# Patient Record
Sex: MALE | Race: BLACK OR AFRICAN AMERICAN | NOT HISPANIC OR LATINO | URBAN - METROPOLITAN AREA
[De-identification: names, ages, dates, MRNs, and addresses within clinical notes are randomized per-mention and may not be internally consistent; named-entity substitution may affect disease eponyms.]

---

## 2019-11-26 ENCOUNTER — EMERGENCY (EMERGENCY)
Facility: HOSPITAL | Age: 54
LOS: 1 days | Discharge: HOME | End: 2019-11-26
Admitting: EMERGENCY MEDICINE
Payer: SELF-PAY

## 2019-11-26 VITALS
WEIGHT: 179.9 LBS | OXYGEN SATURATION: 99 % | SYSTOLIC BLOOD PRESSURE: 114 MMHG | RESPIRATION RATE: 18 BRPM | DIASTOLIC BLOOD PRESSURE: 82 MMHG | HEART RATE: 88 BPM | TEMPERATURE: 98 F | HEIGHT: 70 IN

## 2019-11-26 DIAGNOSIS — R53.83 OTHER FATIGUE: ICD-10-CM

## 2019-11-26 DIAGNOSIS — R41.82 ALTERED MENTAL STATUS, UNSPECIFIED: ICD-10-CM

## 2019-11-26 DIAGNOSIS — F10.129 ALCOHOL ABUSE WITH INTOXICATION, UNSPECIFIED: ICD-10-CM

## 2019-11-26 PROCEDURE — 99284 EMERGENCY DEPT VISIT MOD MDM: CPT

## 2019-11-26 PROCEDURE — 99053 MED SERV 10PM-8AM 24 HR FAC: CPT

## 2019-11-26 NOTE — ED PROVIDER NOTE - CLINICAL SUMMARY MEDICAL DECISION MAKING FREE TEXT BOX
Alcohol intoxication with cocaine use, no signs of trauma, not hypoglycemic, neuro exam non-focal, will observe and reassess frequently.

## 2019-11-26 NOTE — ED PROVIDER NOTE - OBJECTIVE STATEMENT
53 y/o male BIB by EMS for AMS.  Admits to drinking alcohol and smoking cocaine today, no other complaints.  Placed in stretcher and quickly falls asleep.  Unable to cooperate with remainder of history.

## 2019-11-26 NOTE — ED ADULT TRIAGE NOTE - CHIEF COMPLAINT QUOTE
[place] and use of cocaine. Pt. with no complaints. No s/s of injury or fall

## 2019-11-26 NOTE — ED PROVIDER NOTE - UNABLE TO OBTAIN
HPI limited 2/2 patient's clinical condition. Urgent need for Intervention ROS limited 2/2 patient's clinical condition.

## 2019-11-26 NOTE — ED PROVIDER NOTE - PATIENT PORTAL LINK FT
You can access the FollowMyHealth Patient Portal offered by [facility] by registering at the following website: [email or web address]. By joining [employer]’s FollowMyHealth portal, you will also be able to view your health information using other applications (apps) compatible with our system.

## 2019-11-27 VITALS
TEMPERATURE: 98 F | DIASTOLIC BLOOD PRESSURE: 74 MMHG | OXYGEN SATURATION: 100 % | SYSTOLIC BLOOD PRESSURE: 118 MMHG | HEART RATE: 75 BPM | RESPIRATION RATE: 18 BRPM

## 2019-11-27 NOTE — ED ADULT NURSE NOTE - OBJECTIVE STATEMENT
53 y/o M [place] for alcohol intoxication and cocaine use. Pt is very lethargic and unable to provide PMH. RR 14, unlabored. Pupils sluggish. No obvious s/s of trauma.

## 2019-11-27 NOTE — ED ADULT NURSE NOTE - NSIMPLEMENTINTERV_GEN_ALL_ED
Implemented All Fall Risk Interventions:  [place] to call system. Call bell, personal items and telephone within reach. Instruct patient to call for assistance. Room bathroom lighting operational. Non-slip footwear when patient is off stretcher. Physically safe environment: no spills, clutter or unnecessary equipment. Stretcher in lowest position, wheels locked, appropriate side rails in place. Provide visual cue, wrist band, yellow gown, etc. Monitor gait and stability. Monitor for mental status changes and reorient to person, place, and time. Review medications for side effects contributing to fall risk. Reinforce activity limits and safety measures with patient and family.

## 2019-11-27 NOTE — ED ADULT NURSE NOTE - CHPI ED NUR SYMPTOMS NEG
no chills/no pain/no abdominal pain/no weakness/no nausea/no vomiting/no fever/no abdominal distension

## 2019-12-09 ENCOUNTER — EMERGENCY (EMERGENCY)
Facility: HOSPITAL | Age: 54
LOS: 1 days | Discharge: HOME | End: 2019-12-09
Attending: EMERGENCY MEDICINE
Payer: SELF-PAY

## 2019-12-09 VITALS
HEIGHT: 67 IN | WEIGHT: 220.02 LBS | SYSTOLIC BLOOD PRESSURE: 107 MMHG | OXYGEN SATURATION: 100 % | HEART RATE: 87 BPM | TEMPERATURE: 98 F | DIASTOLIC BLOOD PRESSURE: 67 MMHG | RESPIRATION RATE: 16 BRPM

## 2019-12-09 LAB
ANION GAP SERPL CALC-SCNC: 6 MMOL/L — SIGNIFICANT CHANGE UP (ref 5–17)
BUN SERPL-MCNC: 10 MG/DL — SIGNIFICANT CHANGE UP (ref 7–18)
CALCIUM SERPL-MCNC: 8.6 MG/DL — SIGNIFICANT CHANGE UP (ref 8.4–10.5)
CHLORIDE SERPL-SCNC: 107 MMOL/L — SIGNIFICANT CHANGE UP (ref 96–108)
CO2 SERPL-SCNC: 26 MMOL/L — SIGNIFICANT CHANGE UP (ref 22–31)
CREAT SERPL-MCNC: 0.87 MG/DL — SIGNIFICANT CHANGE UP (ref 0.5–1.3)
ETHANOL SERPL-MCNC: 213 MG/DL — HIGH (ref 0–10)
GLUCOSE SERPL-MCNC: 199 MG/DL — HIGH (ref 70–99)
HCT VFR BLD CALC: 39.8 % — SIGNIFICANT CHANGE UP (ref 39–50)
HGB BLD-MCNC: 13.2 G/DL — SIGNIFICANT CHANGE UP (ref 13–17)
MCHC RBC-ENTMCNC: 28.3 PG — SIGNIFICANT CHANGE UP (ref 27–34)
MCHC RBC-ENTMCNC: 33.2 GM/DL — SIGNIFICANT CHANGE UP (ref 32–36)
MCV RBC AUTO: 85.2 FL — SIGNIFICANT CHANGE UP (ref 80–100)
NRBC # BLD: 0 /100 WBCS — SIGNIFICANT CHANGE UP (ref 0–0)
PLATELET # BLD AUTO: 322 K/UL — SIGNIFICANT CHANGE UP (ref 150–400)
POTASSIUM SERPL-MCNC: 3.5 MMOL/L — SIGNIFICANT CHANGE UP (ref 3.5–5.3)
POTASSIUM SERPL-SCNC: 3.5 MMOL/L — SIGNIFICANT CHANGE UP (ref 3.5–5.3)
RBC # BLD: 4.67 M/UL — SIGNIFICANT CHANGE UP (ref 4.2–5.8)
RBC # FLD: 15.5 % — HIGH (ref 10.3–14.5)
SODIUM SERPL-SCNC: 139 MMOL/L — SIGNIFICANT CHANGE UP (ref 135–145)
WBC # BLD: 9.55 K/UL — SIGNIFICANT CHANGE UP (ref 3.8–10.5)
WBC # FLD AUTO: 9.55 K/UL — SIGNIFICANT CHANGE UP (ref 3.8–10.5)

## 2019-12-09 PROCEDURE — 99283 EMERGENCY DEPT VISIT LOW MDM: CPT

## 2019-12-09 PROCEDURE — 36415 COLL VENOUS BLD VENIPUNCTURE: CPT

## 2019-12-09 PROCEDURE — 80048 BASIC METABOLIC PNL TOTAL CA: CPT

## 2019-12-09 PROCEDURE — 82962 GLUCOSE BLOOD TEST: CPT

## 2019-12-09 PROCEDURE — 80307 DRUG TEST PRSMV CHEM ANLYZR: CPT

## 2019-12-09 PROCEDURE — 99285 EMERGENCY DEPT VISIT HI MDM: CPT | Mod: 25

## 2019-12-09 PROCEDURE — 85027 COMPLETE CBC AUTOMATED: CPT

## 2019-12-09 NOTE — ED PROVIDER NOTE - PHYSICAL EXAMINATION
General: pt lying in stretcher, appears stated age and is not in distress  HEENT: AT/NC, pink conjunctiva, anicteric sclerae, EOMI, PERRLA, TMs smooth, grey, intact, with normal landmarks, nasal mucosa pink, no discharge, turbinates not enlarged; moist mucus membranes, tongue well-papillated, good dentition; posterior pharynx shows no erythema or exudates;   Neck: supple, full ROM, trachea midline, no JVD, no cervical LAD, no midline ttp or stepoffs  Lungs: symmetric excursion, b/l clear vesicular breath sounds with no wheezes, crackles, or rhonchi  Heart: rrr, S1, S2 normal; no S3 or S4; no murmurs or rubs  Abd: normal bowel sounds; soft, nontender; negative McBurney's point tenderness, negative Perry's sign, no rebound, no guarding, spleen non-palpable; no hepatomegaly, no masses  Back: no midline spinal tenderness or stepoffs, no costovertebral angle tenderness  Extremities: no clubbing, cyanosis, or edema; no palpable deformities or fractures  Skin: good turgor; no rashes, petechiae, ecchymoses, or jaundice  Pulses: radial, posterior tibialis (PT), dorsalis pedis (DP) all 2+ & symmetric  Neuro: awake, alert, responsive; oriented to person, place and time; cranial nerves intact, EOMI, intact jaw movement, intact facial sensation, no facial asymmetry, hearing intact; no nystagmus, tongue midline; Motor: Normal tone in upper and lower extremities bilaterally strength 5/5; Sensory: intact to pinprick and light touch; Cerebellar: finger-to-nose intact; normal steady gait; negative Romberg’s sign; DTR: biceps, triceps, patellar, 2+, no pronator drift General: pt lying in stretcher, appears stated age and is not in distress, speaking in full clear sentences  HEENT: AT/NC, pink conjunctiva, anicteric sclerae, EOMI, PERRLA, moist mucus membranes, tongue well-papillated, posterior pharynx shows no erythema or exudates;   Neck: supple, full ROM, trachea midline, no JVD, no cervical LAD, no midline ttp or stepoffs  Lungs: symmetric excursion, b/l clear vesicular breath sounds with no wheezes, crackles, or rhonchi  Heart: rrr, S1, S2 normal; no S3 or S4; no murmurs or rubs  Abd: normal bowel sounds; soft, nontender; negative McBurney's point tenderness, negative Perry's sign, no rebound, no guarding, spleen non-palpable; no hepatomegaly, no masses  Back: no midline spinal tenderness or stepoffs, no costovertebral angle tenderness  Extremities: no clubbing, cyanosis, or edema; no palpable deformities or fractures  Skin: good turgor; no rashes, petechiae, ecchymoses, or jaundice  Pulses: radial, posterior tibialis (PT), dorsalis pedis (DP) all 2+ & symmetric  Neuro: awake, alert, responsive; oriented to person, place and time; cranial nerves intact, EOMI, intact jaw movement, intact facial sensation, no facial asymmetry, hearing intact; no nystagmus, tongue midline; Motor: Normal tone in upper and lower extremities bilaterally strength 5/5; Sensory: intact; normal steady gait;

## 2019-12-09 NOTE — ED PROVIDER NOTE - CLINICAL SUMMARY MEDICAL DECISION MAKING FREE TEXT BOX
Pt brought in for alcohol intoxication w/ no signs of trauma. Will get labs, monitor and reassess  Pt monitored uneventfully. aaox3, ambulating w/ normal steady gait, clinically sober, ready for dc home. Pt brought in for alcohol intoxication w/ no signs of trauma. Will get labs, monitor and reassess  Pt monitored uneventfully. He refused labs. aaox3, ambulating w/ normal steady gait, clinically sober, ready for dc home.

## 2019-12-09 NOTE — ED PROVIDER NOTE - OBJECTIVE STATEMENT
54 year old male with no pertinent PSHx or PMHx presents to the ED via EMS [place]. Patient admits to alcohol use. Patient otherwise denies any trauma or falls. EDWINADA.

## 2019-12-09 NOTE — ED PROVIDER NOTE - PATIENT PORTAL LINK FT
You can access the FollowMyHealth Patient Portal offered by [facility] by registering at the following website: [email or web address]. By joining [employer]’s FollowMyHealth portal, you will also be able to view your health information using other applications (apps) compatible with our system.

## 2019-12-09 NOTE — ED ADULT NURSE NOTE - INTEGUMENTARY WDL
Patient presented to the ED with complaints of nausea and vomiting  She was nauseous all day yesterday yesterday and felt under the weather  Around 10PM patient started to vomit with coffee ground emesis  Last BM was around midnight and it was normal   Patient was just discharged from the hospital on 7/10 for an elective laparoscopy  Hgb at baseline, 9 8      · Admit to medicine  · Hgb q 8 hours, transfuse for acute drop  · NPO  · Protonix drip  · IVF, zofran PRN  · GI consultation Color consistent with ethnicity/race, warm, dry intact, resilient.

## 2020-03-25 ENCOUNTER — EMERGENCY (EMERGENCY)
Facility: HOSPITAL | Age: 55
LOS: 1 days | Discharge: HOME | End: 2020-03-25
Attending: EMERGENCY MEDICINE
Payer: MEDICAID

## 2020-03-25 VITALS
SYSTOLIC BLOOD PRESSURE: 132 MMHG | DIASTOLIC BLOOD PRESSURE: 86 MMHG | HEART RATE: 80 BPM | OXYGEN SATURATION: 100 % | TEMPERATURE: 98 F | RESPIRATION RATE: 18 BRPM

## 2020-03-25 VITALS
HEIGHT: 68 IN | RESPIRATION RATE: 20 BRPM | TEMPERATURE: 98 F | WEIGHT: 158.07 LBS | HEART RATE: 81 BPM | SYSTOLIC BLOOD PRESSURE: 141 MMHG | DIASTOLIC BLOOD PRESSURE: 86 MMHG | OXYGEN SATURATION: 100 %

## 2020-03-25 VITALS
DIASTOLIC BLOOD PRESSURE: 68 MMHG | HEIGHT: 68 IN | WEIGHT: 167.99 LBS | RESPIRATION RATE: 20 BRPM | OXYGEN SATURATION: 99 % | SYSTOLIC BLOOD PRESSURE: 96 MMHG | HEART RATE: 83 BPM

## 2020-03-25 LAB
ALBUMIN SERPL ELPH-MCNC: 1.8 G/DL — LOW (ref 3.3–5)
ALBUMIN SERPL ELPH-MCNC: 4 G/DL — SIGNIFICANT CHANGE UP (ref 3.3–5)
ALP SERPL-CCNC: 39 U/L — LOW (ref 40–120)
ALP SERPL-CCNC: 74 U/L — SIGNIFICANT CHANGE UP (ref 40–120)
ALT FLD-CCNC: 16 U/L — SIGNIFICANT CHANGE UP (ref 10–45)
ALT FLD-CCNC: <5 U/L — LOW (ref 10–45)
ANION GAP SERPL CALC-SCNC: 12 MMOL/L — SIGNIFICANT CHANGE UP (ref 5–17)
ANION GAP SERPL CALC-SCNC: 14 MMOL/L — SIGNIFICANT CHANGE UP (ref 5–17)
AST SERPL-CCNC: 12 U/L — SIGNIFICANT CHANGE UP (ref 10–40)
AST SERPL-CCNC: 70 U/L — HIGH (ref 10–40)
BASOPHILS # BLD AUTO: 0.05 K/UL — SIGNIFICANT CHANGE UP (ref 0–0.2)
BASOPHILS NFR BLD AUTO: 0.7 % — SIGNIFICANT CHANGE UP (ref 0–2)
BILIRUB SERPL-MCNC: 0.2 MG/DL — SIGNIFICANT CHANGE UP (ref 0.2–1.2)
BILIRUB SERPL-MCNC: 0.4 MG/DL — SIGNIFICANT CHANGE UP (ref 0.2–1.2)
BUN SERPL-MCNC: 5 MG/DL — LOW (ref 7–23)
BUN SERPL-MCNC: 8 MG/DL — SIGNIFICANT CHANGE UP (ref 7–23)
CALCIUM SERPL-MCNC: 5.2 MG/DL — CRITICAL LOW (ref 8.4–10.5)
CALCIUM SERPL-MCNC: 9.7 MG/DL — SIGNIFICANT CHANGE UP (ref 8.4–10.5)
CHLORIDE SERPL-SCNC: 115 MMOL/L — HIGH (ref 96–108)
CHLORIDE SERPL-SCNC: 90 MMOL/L — LOW (ref 96–108)
CO2 SERPL-SCNC: 13 MMOL/L — LOW (ref 22–31)
CO2 SERPL-SCNC: 23 MMOL/L — SIGNIFICANT CHANGE UP (ref 22–31)
CREAT SERPL-MCNC: 0.36 MG/DL — LOW (ref 0.5–1.3)
CREAT SERPL-MCNC: 0.56 MG/DL — SIGNIFICANT CHANGE UP (ref 0.5–1.3)
EOSINOPHIL # BLD AUTO: 0.16 K/UL — SIGNIFICANT CHANGE UP (ref 0–0.5)
EOSINOPHIL NFR BLD AUTO: 2.1 % — SIGNIFICANT CHANGE UP (ref 0–6)
GLUCOSE SERPL-MCNC: 255 MG/DL — HIGH (ref 70–99)
GLUCOSE SERPL-MCNC: 397 MG/DL — HIGH (ref 70–99)
HCT VFR BLD CALC: 44.7 % — SIGNIFICANT CHANGE UP (ref 39–50)
HGB BLD-MCNC: 14.1 G/DL — SIGNIFICANT CHANGE UP (ref 13–17)
IMM GRANULOCYTES NFR BLD AUTO: 0.3 % — SIGNIFICANT CHANGE UP (ref 0–1.5)
LYMPHOCYTES # BLD AUTO: 2.46 K/UL — SIGNIFICANT CHANGE UP (ref 1–3.3)
LYMPHOCYTES # BLD AUTO: 32.8 % — SIGNIFICANT CHANGE UP (ref 13–44)
MCHC RBC-ENTMCNC: 27.5 PG — SIGNIFICANT CHANGE UP (ref 27–34)
MCHC RBC-ENTMCNC: 31.5 GM/DL — LOW (ref 32–36)
MCV RBC AUTO: 87.1 FL — SIGNIFICANT CHANGE UP (ref 80–100)
MONOCYTES # BLD AUTO: 0.49 K/UL — SIGNIFICANT CHANGE UP (ref 0–0.9)
MONOCYTES NFR BLD AUTO: 6.5 % — SIGNIFICANT CHANGE UP (ref 2–14)
NEUTROPHILS # BLD AUTO: 4.32 K/UL — SIGNIFICANT CHANGE UP (ref 1.8–7.4)
NEUTROPHILS NFR BLD AUTO: 57.6 % — SIGNIFICANT CHANGE UP (ref 43–77)
NRBC # BLD: 0 /100 WBCS — SIGNIFICANT CHANGE UP (ref 0–0)
PLATELET # BLD AUTO: 399 K/UL — SIGNIFICANT CHANGE UP (ref 150–400)
POTASSIUM SERPL-MCNC: 2.9 MMOL/L — CRITICAL LOW (ref 3.5–5.3)
POTASSIUM SERPL-MCNC: 7.3 MMOL/L — CRITICAL HIGH (ref 3.5–5.3)
POTASSIUM SERPL-SCNC: 2.9 MMOL/L — CRITICAL LOW (ref 3.5–5.3)
POTASSIUM SERPL-SCNC: 7.3 MMOL/L — CRITICAL HIGH (ref 3.5–5.3)
PROT SERPL-MCNC: 4.6 G/DL — LOW (ref 6–8.3)
PROT SERPL-MCNC: 9.6 G/DL — HIGH (ref 6–8.3)
RBC # BLD: 5.13 M/UL — SIGNIFICANT CHANGE UP (ref 4.2–5.8)
RBC # FLD: 15.8 % — HIGH (ref 10.3–14.5)
SODIUM SERPL-SCNC: 127 MMOL/L — LOW (ref 135–145)
SODIUM SERPL-SCNC: 140 MMOL/L — SIGNIFICANT CHANGE UP (ref 135–145)
WBC # BLD: 7.5 K/UL — SIGNIFICANT CHANGE UP (ref 3.8–10.5)
WBC # FLD AUTO: 7.5 K/UL — SIGNIFICANT CHANGE UP (ref 3.8–10.5)

## 2020-03-25 PROCEDURE — 70450 CT HEAD/BRAIN W/O DYE: CPT | Mod: 26

## 2020-03-25 PROCEDURE — 99285 EMERGENCY DEPT VISIT HI MDM: CPT

## 2020-03-25 PROCEDURE — 71046 X-RAY EXAM CHEST 2 VIEWS: CPT | Mod: 26

## 2020-03-25 PROCEDURE — 80053 COMPREHEN METABOLIC PANEL: CPT

## 2020-03-25 PROCEDURE — 70450 CT HEAD/BRAIN W/O DYE: CPT

## 2020-03-25 PROCEDURE — 72125 CT NECK SPINE W/O DYE: CPT | Mod: 26

## 2020-03-25 PROCEDURE — 93010 ELECTROCARDIOGRAM REPORT: CPT

## 2020-03-25 PROCEDURE — 85027 COMPLETE CBC AUTOMATED: CPT

## 2020-03-25 PROCEDURE — 72125 CT NECK SPINE W/O DYE: CPT

## 2020-03-25 PROCEDURE — 71046 X-RAY EXAM CHEST 2 VIEWS: CPT

## 2020-03-25 RX ORDER — SODIUM CHLORIDE 9 MG/ML
1000 INJECTION, SOLUTION INTRAVENOUS ONCE
Refills: 0 | Status: COMPLETED | OUTPATIENT
Start: 2020-03-25 | End: 2020-03-25

## 2020-03-25 RX ADMIN — SODIUM CHLORIDE 1000 MILLILITER(S): 9 INJECTION, SOLUTION INTRAVENOUS at 18:01

## 2020-03-25 NOTE — ED PROVIDER NOTE - ATTENDING CONTRIBUTION TO CARE
Pt drinking alcohol, and states he "fell out."  Hit head, no LOC.  Pain posterior head.  Drinks daily, last drink apparently in waiting room.  Pt awake, alert, oriented, no c/t/l spine td, clear lungs, ab soft, nt, no external signs of trauma of entire body.  Will ct head, screening labs to r/o hyponatremia/metabolic derangement, and iv fluids and likely discharge when clinically sober.

## 2020-03-25 NOTE — ED PROVIDER NOTE - ENMT, MLM
Airway patent, Nasal mucosa clear. Mouth with normal mucosa. Throat has no vesicles, no oropharyngeal exudates and uvula is midline. No tongue tremors and fasciculation.

## 2020-03-25 NOTE — ED PROVIDER NOTE - OBJECTIVE STATEMENT
55M PMHx of ETOH (last drink 1.5 hrs ago, 1.5 pints liquor per day) p/w alcohol intoxication and fall. Reports mechanical fall and 1 vomiting episode. Of note, urinated in waiting room. Denies any cp, sob, cough, fevers, abdominal pain, headaches. Home: [place], [place], here in [facility] visiting a friend.

## 2020-03-25 NOTE — ED PROVIDER NOTE - PATIENT PORTAL LINK FT
You can access the FollowMyHealth Patient Portal offered by [facility] by registering at the following website: [email or web address]. By joining "[employer]"’s FollowMyHealth portal, you will also be able to view your health information using other applications (apps) compatible with our system.

## 2020-03-25 NOTE — ED PROVIDER NOTE - NSFOLLOWUPINSTRUCTIONS_ED_ALL_ED_FT
return to the emergency room if you experience worsening symptoms or new concerning symptoms.    Alcohol Abuse    Alcohol intoxication occurs when the amount of alcohol that a person has consumed impairs his or her ability to mentally and physically function. Chronic alcohol consumption can also lead to a variety of health issues including neurological disease, stomach disease, heart disease, liver disease, etc. Do not drive after drinking alcohol. Drinking enough alcohol to end up in an Emergency Room suggests you may have an alcohol abuse problem. Seek help at a drug addiction center.    SEEK IMMEDIATE MEDICAL CARE IF YOU HAVE ANY OF THE FOLLOWING SYMPTOMS: seizures, vomiting blood, blood in your stool, lightheadedness/dizziness, or becoming shaky to tremulous when you stop drinking.

## 2020-03-25 NOTE — ED ADULT TRIAGE NOTE - CHIEF COMPLAINT QUOTE
Pt who has +alcohol on breath; was [place] ambulance [place] mall; Pt c/o SOB and cough for 2 days

## 2020-03-25 NOTE — ED PROVIDER NOTE - CONSTITUTIONAL, MLM
normal... Intoxicated appearing, awake, alert, oriented to person, place, time/situation and in no apparent distress.

## 2020-03-25 NOTE — ED ADULT NURSE NOTE - OBJECTIVE STATEMENT
55 y M arrived to the ED  ETOH. Pt arrived to the ED ETOH, poor historian states he has been drinking for 22 years and drinks 1 1/2 pints of liquor per day. Pt irritable upon arrival asking for food, states he has been having sob for two days. Pt able to ambulate. MD CJ at bedside

## 2020-03-25 NOTE — ED ADULT NURSE NOTE - NSIMPLEMENTINTERV_GEN_ALL_ED
Implemented All Fall Risk Interventions:  [place] to call system. Call bell, personal items and telephone within reach. Instruct patient to call for assistance. Room bathroom lighting operational. Non-slip footwear when patient is off stretcher. Physically safe environment: no spills, clutter or unnecessary equipment. Stretcher in lowest position, wheels locked, appropriate side rails in place. Provide visual cue, wrist band, yellow gown, etc. Monitor gait and stability. Monitor for mental status changes and reorient to person, place, and time. Review medications for side effects contributing to fall risk. Reinforce activity limits and safety measures with patient and family.

## 2020-03-25 NOTE — ED PROVIDER NOTE - CLINICAL SUMMARY MEDICAL DECISION MAKING FREE TEXT BOX
ct head/neck for traumatic fall, unwitnessed. clearly intoxicated, last drink 1 hour ago, not in any withdrawal symptoms. not tremulous or tachycardic. will observe till he is clinically sober.

## 2020-03-26 VITALS — HEART RATE: 94 BPM

## 2020-03-26 LAB
ALBUMIN SERPL ELPH-MCNC: 3.7 G/DL — SIGNIFICANT CHANGE UP (ref 3.3–5)
ALP SERPL-CCNC: 73 U/L — SIGNIFICANT CHANGE UP (ref 40–120)
ALT FLD-CCNC: 8 U/L — LOW (ref 10–45)
ANION GAP SERPL CALC-SCNC: 15 MMOL/L — SIGNIFICANT CHANGE UP (ref 5–17)
AST SERPL-CCNC: 14 U/L — SIGNIFICANT CHANGE UP (ref 10–40)
BASOPHILS # BLD AUTO: 0.05 K/UL — SIGNIFICANT CHANGE UP (ref 0–0.2)
BASOPHILS NFR BLD AUTO: 0.8 % — SIGNIFICANT CHANGE UP (ref 0–2)
BILIRUB SERPL-MCNC: 0.6 MG/DL — SIGNIFICANT CHANGE UP (ref 0.2–1.2)
BUN SERPL-MCNC: 10 MG/DL — SIGNIFICANT CHANGE UP (ref 7–23)
CALCIUM SERPL-MCNC: 9.5 MG/DL — SIGNIFICANT CHANGE UP (ref 8.4–10.5)
CHLORIDE SERPL-SCNC: 92 MMOL/L — LOW (ref 96–108)
CO2 SERPL-SCNC: 24 MMOL/L — SIGNIFICANT CHANGE UP (ref 22–31)
CREAT SERPL-MCNC: 0.84 MG/DL — SIGNIFICANT CHANGE UP (ref 0.5–1.3)
EOSINOPHIL # BLD AUTO: 0.26 K/UL — SIGNIFICANT CHANGE UP (ref 0–0.5)
EOSINOPHIL NFR BLD AUTO: 4.3 % — SIGNIFICANT CHANGE UP (ref 0–6)
GLUCOSE SERPL-MCNC: 281 MG/DL — HIGH (ref 70–99)
HCT VFR BLD CALC: 38.8 % — LOW (ref 39–50)
HGB BLD-MCNC: 12.8 G/DL — LOW (ref 13–17)
IMM GRANULOCYTES NFR BLD AUTO: 0.3 % — SIGNIFICANT CHANGE UP (ref 0–1.5)
LYMPHOCYTES # BLD AUTO: 1.88 K/UL — SIGNIFICANT CHANGE UP (ref 1–3.3)
LYMPHOCYTES # BLD AUTO: 30.8 % — SIGNIFICANT CHANGE UP (ref 13–44)
MCHC RBC-ENTMCNC: 28.5 PG — SIGNIFICANT CHANGE UP (ref 27–34)
MCHC RBC-ENTMCNC: 33 GM/DL — SIGNIFICANT CHANGE UP (ref 32–36)
MCV RBC AUTO: 86.4 FL — SIGNIFICANT CHANGE UP (ref 80–100)
MONOCYTES # BLD AUTO: 0.64 K/UL — SIGNIFICANT CHANGE UP (ref 0–0.9)
MONOCYTES NFR BLD AUTO: 10.5 % — SIGNIFICANT CHANGE UP (ref 2–14)
NEUTROPHILS # BLD AUTO: 3.25 K/UL — SIGNIFICANT CHANGE UP (ref 1.8–7.4)
NEUTROPHILS NFR BLD AUTO: 53.3 % — SIGNIFICANT CHANGE UP (ref 43–77)
NRBC # BLD: 0 /100 WBCS — SIGNIFICANT CHANGE UP (ref 0–0)
PLATELET # BLD AUTO: 380 K/UL — SIGNIFICANT CHANGE UP (ref 150–400)
POTASSIUM SERPL-MCNC: 3.7 MMOL/L — SIGNIFICANT CHANGE UP (ref 3.5–5.3)
POTASSIUM SERPL-SCNC: 3.7 MMOL/L — SIGNIFICANT CHANGE UP (ref 3.5–5.3)
PROT SERPL-MCNC: 8.1 G/DL — SIGNIFICANT CHANGE UP (ref 6–8.3)
RBC # BLD: 4.49 M/UL — SIGNIFICANT CHANGE UP (ref 4.2–5.8)
RBC # FLD: 15.2 % — HIGH (ref 10.3–14.5)
SODIUM SERPL-SCNC: 131 MMOL/L — LOW (ref 135–145)
TROPONIN T, HIGH SENSITIVITY RESULT: 22 NG/L — SIGNIFICANT CHANGE UP (ref 0–51)
TROPONIN T, HIGH SENSITIVITY RESULT: 24 NG/L — SIGNIFICANT CHANGE UP (ref 0–51)
WBC # BLD: 6.1 K/UL — SIGNIFICANT CHANGE UP (ref 3.8–10.5)
WBC # FLD AUTO: 6.1 K/UL — SIGNIFICANT CHANGE UP (ref 3.8–10.5)

## 2020-03-26 PROCEDURE — 71046 X-RAY EXAM CHEST 2 VIEWS: CPT | Mod: 26

## 2020-03-26 PROCEDURE — 99284 EMERGENCY DEPT VISIT MOD MDM: CPT

## 2020-03-26 PROCEDURE — 85027 COMPLETE CBC AUTOMATED: CPT

## 2020-03-26 PROCEDURE — 71046 X-RAY EXAM CHEST 2 VIEWS: CPT

## 2020-03-26 PROCEDURE — 84484 ASSAY OF TROPONIN QUANT: CPT

## 2020-03-26 PROCEDURE — 93005 ELECTROCARDIOGRAM TRACING: CPT

## 2020-03-26 PROCEDURE — 80053 COMPREHEN METABOLIC PANEL: CPT

## 2020-03-26 RX ORDER — SODIUM CHLORIDE 9 MG/ML
1000 INJECTION INTRAMUSCULAR; INTRAVENOUS; SUBCUTANEOUS ONCE
Refills: 0 | Status: DISCONTINUED | OUTPATIENT
Start: 2020-03-26 | End: 2020-03-29

## 2020-03-26 RX ORDER — SODIUM CHLORIDE 9 MG/ML
1000 INJECTION INTRAMUSCULAR; INTRAVENOUS; SUBCUTANEOUS ONCE
Refills: 0 | Status: COMPLETED | OUTPATIENT
Start: 2020-03-26 | End: 2020-03-26

## 2020-03-26 RX ADMIN — SODIUM CHLORIDE 1000 MILLILITER(S): 9 INJECTION INTRAMUSCULAR; INTRAVENOUS; SUBCUTANEOUS at 08:19

## 2020-03-26 RX ADMIN — Medication 25 MILLIGRAM(S): at 08:19

## 2020-03-26 NOTE — ED PROVIDER NOTE - PATIENT PORTAL LINK FT
You can access the FollowMyHealth Patient Portal offered by [facility] by registering at the following website: [email or web address]. By joining [employer]’s FollowMyHealth portal, you will also be able to view your health information using other applications (apps) compatible with our system.

## 2020-03-26 NOTE — ED PROVIDER NOTE - CLINICAL SUMMARY MEDICAL DECISION MAKING FREE TEXT BOX
Francisco Javier, PGY1 - 55M PMH HTN, DM, seizures, ETOH abuse p/w intermittent CP and SOB x 14hrs PTA. Well-appearing on exam, vitals WNL. CTAB. EKG NSR with RBBB. Will evaluate for ACS. Plan: labs, cxr.

## 2020-03-26 NOTE — ED PROVIDER NOTE - NS ED ROS FT
General: Denies fevers or chills  Eyes: Denies vision changes  ENMT: Denies trouble swallowing or speaking, or sore throat  CV: +Chest pain  Resp: +SOB. Denies cough  GI: Denies abdominal pain, nausea, vomiting, diarrhea, or constipation   : Denies painful urination, increased urinary frequency, or blood in urine  Skin: Denies new rashes  Neuro: Denies headache, numbness, or weakness  MSK: No joint pain

## 2020-03-26 NOTE — ED ADULT NURSE NOTE - CHPI ED NUR SYMPTOMS NEG
no syncope/no chills/no diaphoresis/no congestion/no dizziness/no nausea/no back pain/no fever/no vomiting/no shortness of breath

## 2020-03-26 NOTE — ED PROVIDER NOTE - PROGRESS NOTE DETAILS
Rebecca: Patient received in sign out. Noted to be mildly tachycardic. This resolved after fluid bolus and librium. patient feels symptomatically improved, walking around and tolerating PO. Patient chest pain appears MSK in nature as he had recent fall onto his bottom a few days ago. His chest pain is worse when he takes deep breaths in and moves his arms.

## 2020-03-26 NOTE — ED ADULT NURSE NOTE - OBJECTIVE STATEMENT
54 yo M pmh of seizures, DM, HTN, ETOH abuse, denies withdrawals, last drink yesterday ambulated to ED c/o sudden onset CP at 22:00 last night that resolved on its own after a hour.  Pt states that he has had this pain before, but never saw a cardiologist.  Denies CP, back pain, SOB, fevers/chills, n/v/d, lightheadedness, dizziness, changes in urinary or bowel habits.  A&Ox4, VSS, IV established in right forearm.  EKG performed. Safety and comfort maintained. Will continue to monitor.

## 2020-03-26 NOTE — ED PROVIDER NOTE - ATTENDING CONTRIBUTION TO CARE
pt in 1 day of intermittent cp, cough. no sob, fever. on exam, very well appearing, clear lungs, breathing comfortably, no fever, normal vitals. low suspicion for ACS, low suspcion for significant infection. will check labs, cxr, trop but if neg, suspect ok to d/c.

## 2020-03-26 NOTE — ED PROVIDER NOTE - OBJECTIVE STATEMENT
55M PMH HTN, DM, seizures, ETOH abuse (daily drinker, last drink yesterday, denies h/o alcohol withdrawal/seizures) p/w intermittent CP and SOB x14hrs PTA. States he's had this CP before, but has never seen a cardiologist. No associated n/v/sweats. Currently asymptomatic. No f/c, URI sx, abdominal pain, dysuria. Of note, pt was evaluated in ER yesterday for alcohol intoxication and fall.

## 2020-03-27 ENCOUNTER — EMERGENCY (EMERGENCY)
Facility: HOSPITAL | Age: 55
LOS: 1 days | Discharge: HOME | End: 2020-03-27
Attending: EMERGENCY MEDICINE | Admitting: EMERGENCY MEDICINE
Payer: COMMERCIAL

## 2020-03-27 VITALS
TEMPERATURE: 99 F | HEIGHT: 68 IN | HEART RATE: 77 BPM | SYSTOLIC BLOOD PRESSURE: 128 MMHG | OXYGEN SATURATION: 97 % | DIASTOLIC BLOOD PRESSURE: 76 MMHG | WEIGHT: 149.91 LBS | RESPIRATION RATE: 17 BRPM

## 2020-03-27 VITALS
DIASTOLIC BLOOD PRESSURE: 64 MMHG | HEIGHT: 68 IN | WEIGHT: 149.91 LBS | OXYGEN SATURATION: 98 % | HEART RATE: 99 BPM | RESPIRATION RATE: 17 BRPM | TEMPERATURE: 98 F | SYSTOLIC BLOOD PRESSURE: 96 MMHG

## 2020-03-27 DIAGNOSIS — Z04.89 ENCOUNTER FOR EXAMINATION AND OBSERVATION FOR OTHER SPECIFIED REASONS: ICD-10-CM

## 2020-03-27 DIAGNOSIS — Z76.5 MALINGERER [CONSCIOUS SIMULATION]: ICD-10-CM

## 2020-03-27 PROCEDURE — 99283 EMERGENCY DEPT VISIT LOW MDM: CPT

## 2020-03-27 PROCEDURE — 99053 MED SERV 10PM-8AM 24 HR FAC: CPT

## 2020-03-27 NOTE — ED PROVIDER NOTE - CLINICAL SUMMARY MEDICAL DECISION MAKING FREE TEXT BOX
Pt undomiciled, brought in by EMS for sleeping on the street. No specific complains on arrival. Walking without difficulty. Escorted out by security.

## 2020-03-27 NOTE — ED PROVIDER NOTE - PRO INTERPRETER NEED 2
1.  Continue bowel rest with NG tube suction and NPO status  2.  Continue IV fluids  3.  Continue pain control as needed  4.  Surgical consult to determine further treatment plan  5.  Empiric antibiotics of Zosyn and Flagyl  6.  Follow as needed based on clinical course    September 26th 2018:  This patient will be taken for exploratory laparotomy later today                                        Will follow up postsurgically as needed                                         Will repeat labs in the a.m.    September 27, 2018:  Replace magnesium IV 2 g IV over 2 hr recheck in the morning addendum                                       Continue pain control and NPO status until bowel function returned                                       Continue current pain and nausea medications and follow this patient as needed  09/28/2018.  The patient has bowel sounds this morning he has not passed flatus or had a bowel movement.  He does state that he is tender but otherwise doing well.  Patient has excellent breath sounds he can do his IS at 17 50 cc   09/29/2018.  The patient is doing very well today he is passing flatus.  No bowel movements yet.  Patient complaints of mild abdominal pain this morning.  Examination is unrevealing.  Patient is awaiting increasing diet as possible.  The patient has been ambulating and use incentive spirometry.  Discharge is pending per Dr. Horner.  Continue current level of care.    09/30/2028.  See above dictation for assessment and plan. Soft diet today ambulation as possible today consult physical therapy today.  Patient needs to be more ambulatory.  Patient exam LA than blood pressure is fine.  Have added blood pressure medications.  Encouraged the patient to get out of the bed.  Basic exam is within normal limits except for tenderness of the abdomen done status post surgery.  Satisfactory recovery at this time.    10/01/2018:  Will continue ambulating patient                         Referred back to clear liquid diet                         Advanced diet to regular as tolerated                         Monitor and control symptoms overnight                         Possible discharge tomorrow if stable    10/02/2018:  Discharge to home in stable condition.                        He will be discharged if tolerates full liquid diet for supper.                        Follow-up with Dr. Horner within 1-2 weeks in his office.                       Patient advised to continue all current home medications.                       Patient was given prescription for hydrocodone [id number] p.o. q.6 hours p.r.n. pain 20.  No refill     English

## 2020-03-27 NOTE — ED ADULT NURSE REASSESSMENT NOTE - NS ED NURSE REASSESS COMMENT FT1
Pt aggressive with staff, throwing objects, aggressive threatening. Security called. ER attending aware.

## 2020-03-27 NOTE — ED ADULT TRIAGE NOTE - CHIEF COMPLAINT QUOTE
Pt biba found sleeping against store front of liquor store. PT denies any medical complaints. Ambulatory with steady gait without difficulty. A/Ox4. Cursing and aggressive towards staff during assessment.

## 2020-03-27 NOTE — ED ADULT NURSE NOTE - CHPI ED NUR SYMPTOMS NEG
no decreased eating/drinking/no dizziness/no tingling/no vomiting/no fever/no nausea/no pain/no weakness/no chills

## 2020-03-27 NOTE — ED PROVIDER NOTE - OBJECTIVE STATEMENT
56 yo male pt, presents by ems w non specific complains including "help me with my health" but also not wanting to provide further hx and limited exam due to pt being uncooperative. no fever, no chills, no cough.

## 2020-03-27 NOTE — ED PROVIDER NOTE - PATIENT PORTAL LINK FT
You can access the FollowMyHealth Patient Portal offered by [facility] by registering at the following website: [email or web address]. By joining [employer]’s FollowMyHealth portal, you will also be able to view your health information using other applications (apps) compatible with our system.

## 2020-03-27 NOTE — ED ADULT NURSE NOTE - CHPI ED NUR SYMPTOMS NEG
no vomiting/no decreased eating/drinking/no chills/no dizziness/no fever/no tingling/no nausea/no pain

## 2020-03-27 NOTE — ED PROVIDER NOTE - PATIENT PORTAL LINK FT
You can access the FollowMyHealth Patient Portal offered by [facility] by registering at the following website: [email or web address]. By joining [employer]’s FollowMyHealth portal, you will also be able to view your health information using other applications (apps) compatible with our system.

## 2020-03-27 NOTE — ED ADULT TRIAGE NOTE - CHIEF COMPLAINT QUOTE
Pt biba from street for sleeping on sidewalk. Pt was recently D/C from this facility after medical clearance within last hour. Pt denies any medical complaints. As per EMS, BG reading "hi" en route. PT denies insulin use, states he possibly is prescribed oral medications for diabetes, non-compliant.

## 2020-03-27 NOTE — ED PROVIDER NOTE - OBJECTIVE STATEMENT
55 male pt, presents by ems after recent discharge. Pt has no specific complains, is undomiciled and is yelling for food. Had an elevated FS but denies any hx of DM, no meds.

## 2020-03-27 NOTE — ED PROVIDER NOTE - CLINICAL SUMMARY MEDICAL DECISION MAKING FREE TEXT BOX
Maria Teresa back by ems after being discharged recently. Elevated FS by EMS but pt refusing any work up and just wants food. Aggressive towards staff. Escorted out

## 2021-01-19 ENCOUNTER — EMERGENCY (EMERGENCY)
Facility: HOSPITAL | Age: 56
LOS: 1 days | Discharge: HOME | End: 2021-01-19
Attending: EMERGENCY MEDICINE | Admitting: EMERGENCY MEDICINE
Payer: COMMERCIAL

## 2021-01-19 VITALS
HEART RATE: 60 BPM | TEMPERATURE: 98 F | OXYGEN SATURATION: 100 % | SYSTOLIC BLOOD PRESSURE: 122 MMHG | RESPIRATION RATE: 16 BRPM | DIASTOLIC BLOOD PRESSURE: 78 MMHG

## 2021-01-19 PROCEDURE — 72125 CT NECK SPINE W/O DYE: CPT | Mod: 26

## 2021-01-19 PROCEDURE — 70450 CT HEAD/BRAIN W/O DYE: CPT | Mod: 26

## 2021-01-19 PROCEDURE — 99284 EMERGENCY DEPT VISIT MOD MDM: CPT

## 2021-01-19 PROCEDURE — 72170 X-RAY EXAM OF PELVIS: CPT | Mod: 26

## 2021-01-19 PROCEDURE — 71045 X-RAY EXAM CHEST 1 VIEW: CPT | Mod: 26

## 2021-01-19 RX ORDER — FOLIC ACID 0.8 MG
1 TABLET ORAL ONCE
Refills: 0 | Status: COMPLETED | OUTPATIENT
Start: 2021-01-19 | End: 2021-01-19

## 2021-01-19 RX ORDER — ACETAMINOPHEN 500 MG
975 TABLET ORAL ONCE
Refills: 0 | Status: COMPLETED | OUTPATIENT
Start: 2021-01-19 | End: 2021-01-19

## 2021-01-19 RX ORDER — THIAMINE MONONITRATE (VIT B1) 100 MG
100 TABLET ORAL ONCE
Refills: 0 | Status: COMPLETED | OUTPATIENT
Start: 2021-01-19 | End: 2021-01-19

## 2021-01-19 RX ORDER — SODIUM CHLORIDE 9 MG/ML
1000 INJECTION INTRAMUSCULAR; INTRAVENOUS; SUBCUTANEOUS ONCE
Refills: 0 | Status: COMPLETED | OUTPATIENT
Start: 2021-01-19 | End: 2021-01-19

## 2021-01-19 RX ADMIN — Medication 1 MILLIGRAM(S): at 19:24

## 2021-01-19 RX ADMIN — Medication 975 MILLIGRAM(S): at 19:24

## 2021-01-19 RX ADMIN — Medication 100 MILLIGRAM(S): at 19:24

## 2021-01-19 NOTE — ED PROVIDER NOTE - PROGRESS NOTE DETAILS
Patient awake and alert. Admits to heavy alcohol use. States sister would be able to pick vandana up from ED. [phone] called. Message left on voice mail. NIR. Patient awake and alert. Ambulated to rest room. No signs acute withdrawal. Pt familiar with how to get home on bus. Sister not answering phone. Pt to be provided with bus pass. NIR.

## 2021-01-19 NOTE — ED PROVIDER NOTE - OBJECTIVE STATEMENT
The patient is a 55y Male complaining of alcohol intoxication. The patient is a 55y Male brought in by ems from the [facility]/[address], pt asked a passerby to call 911, stating that he had a seizure. pt admits to being intoxicated and drinking daily.  Reports chronic heavy EtOH intake, and seizure d/o.  States he does not have access to seizure meds. Denies any pain or injuries, but does appear intoxicated. Also denies all allergies but, jokingly, "I'm only allergic to needles".  Declining labs/IV.

## 2021-01-19 NOTE — ED ADULT NURSE NOTE - NSIMPLEMENTINTERV_GEN_ALL_ED
Implemented All Universal Safety Interventions:  [place] to call system. Call bell, personal items and telephone within reach. Instruct patient to call for assistance. Room bathroom lighting operational. Non-slip footwear when patient is off stretcher. Physically safe environment: no spills, clutter or unnecessary equipment. Stretcher in lowest position, wheels locked, appropriate side rails in place.

## 2021-01-19 NOTE — ED ADULT NURSE NOTE - TEMPLATE LIST FOR HEAD TO TOE ASSESSMENT
CHIEF COMPLAINT:  Chief Complaint   Patient presents with   • Gyn Exam       SUBJECTIVE:  Rosa Carrizales is a 28 year old G0 woman who presents today for her annual examination. Pt states last pap just under a year ago and pt with hpv. Pt has different insurance now. Pt c/o bloating and pelvic discomfort now x2yrs. Pt was told 2yrs ago had fibroids. Pt reports her MGM  at 43 with both breast and ovarian ca. Pt states her mother is very quiet about this but thinks she had negative genetics. Pt needs refills on larissia ( pt has been off x2wks as ran out). Pt would like a new pcp. 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    Patient's last menstrual period was 2021 (exact date).    History reviewed. No pertinent past medical history.    History reviewed. No pertinent surgical history.    Social History     Tobacco Use   • Smoking status: Never Smoker   • Smokeless tobacco: Never Used   Substance Use Topics   • Alcohol use: Yes     Frequency: Monthly or less     Drinks per session: 1 or 2     Binge frequency: Never   • Drug use: Never       History reviewed. No pertinent family history.    OB History   No obstetric history on file.       ALLERGIES:  No Known Allergies    No outpatient medications have been marked as taking for the 21 encounter (Office Visit) with Luciano Torres MD.       Review of systems   Review of Systems   Gastrointestinal: Positive for abdominal distention.   Genitourinary: Positive for pelvic pain.   All other systems reviewed and are negative.        PHYSICAL EXAM:   OBGyn Exam  Visit Vitals  /75   Pulse (!) 109   Temp 98 °F (36.7 °C)   Wt 60.3 kg (133 lb)   LMP 2021 (Exact Date)   BMI 23.56 kg/m²       GENERAL APEARANCE:  The patient is a well-appearing woman with normal affect and in no acute distress.  SKIN:  Warm and dry to touch.    No hirsutism.  BREASTS:   No palpable masses, non-tender.  No nipple discharge.  No  lymphadenopathy.  ABDOMEN: Soft, non-tender, non-distended.  No masses. No umbilical or inguinal hernias.  PSYCHIATRIC/NEUROLOGIC:  Appropriate mood and affect,   EXTREMITIES:  .  No edema noted.    GENITOURINARY:  Vulva:  Normal-appearing external genitalia.  No lesions, rashes or ulcerations present.  Bladder:  No evidence of urethral or bladder tenderness.  Vagina:  Speculum exam reveals normal vaginal mucosa.  No discharge.  Scant blood in vault  Cervix:  Cervix is normal in appearance with no gross lesions.  There is no cervical motion tenderness.  Uterus:  Uterus is normal size, mobile and non-tender.  Adnexa:  No masses or fullness noted on palpation.  Adnexa are non-tender to palpation.  Perineum:  Perineum appears normal.  Anus:  Normal with no apparent lesions.        ASSESSMENT:  A/P 29yo G0 here for annual exam  There are no diagnoses linked to this encounter.     PLAN:    1) annual: pap today ( pt with new insurance)  Self breast exam reviewed and encouraged  rx larissia refilled x1yr  2) std screening: gccl from pap; serum screening orders given  Condoms encouraged  3) pelvic pain/bloating: pt was told had fibroids 2yrs ago  Recommend repeat u/s  4) fhx breast and ovarian ca: recommend genetic counseling  Referral given  Recommend baseline mammogram as mgm dxd in early 40s she thinks  pcp info given to dr leal  All questions answered    Return to clinic in one year or p.r.n.        The patient indicated understanding of the diagnosis and agreed with the plan of care.       Luciano Torres MD   General

## 2021-01-19 NOTE — ED ADULT NURSE NOTE - OBJECTIVE STATEMENT
56y/o male, A&Ox2 (intoxicated), ambulatory at baseline, received in rm22. pt arrived after being found on street after "witness seizure." on presentation, pt intoxicated, admits to drinking daily. able to complete full sentences, answering to questions appropriately. pt in NAD, equal strength b/l. denies pain, n/v, cough, sob, dizziness, lightheadedness. pt placed on monitor - in NSR. MD presents to bed for eval. pt refused IV at this time. MD made aware. skin intact. bed in lowest position, side rails up, call bell in hand, safety maintained. awaiting further orders. will continue to monitor. belongs across from rm21.

## 2021-01-19 NOTE — ED PROVIDER NOTE - CLINICAL SUMMARY MEDICAL DECISION MAKING FREE TEXT BOX
Pt c h/o ETOH use d/o, seizures, presenting after possible seizure.  Endorses drinknig heavily today.  Will obtain iomaging as ordered, reassess once sober.  No e/o injuries or focal deficits on exam. Is otherwise well appearing.

## 2021-01-19 NOTE — ED PROVIDER NOTE - PATIENT PORTAL LINK FT
You can access the FollowMyHealth Patient Portal offered by [facility] by registering at the following website: [email or web address]. By joining [employer]s FollowMyHealth portal, you will also be able to view your health information using other applications (apps) compatible with our system.

## 2021-01-19 NOTE — ED ADULT NURSE NOTE - CHIEF COMPLAINT QUOTE
pt brought in by ems from the [facility]/[address], pt asked a passerby to call 911, stating that he had a seizure. pt admits to being intoxicated and drinking daily.

## 2021-01-19 NOTE — ED PROVIDER NOTE - NEUROLOGICAL, MLM
Alert and oriented, no focal deficits, no motor or sensory deficits though is intoxciated during initial eval, will assess ambulation once sober.

## 2021-01-19 NOTE — ED PROVIDER NOTE - PMH
HTN (hypertension)    No pertinent past medical history    Type 2 diabetes mellitus without complication, unspecified whether long term insulin use

## 2021-01-19 NOTE — ED ADULT TRIAGE NOTE - CHIEF COMPLAINT QUOTE
pt brought in by ems from the [facility]/[address], pt asked a passerby to call 911, stating that he had a seizure. pt admits to being intoxicated and drinking daily.

## 2021-01-19 NOTE — ED PROVIDER NOTE - ATTENDING CONTRIBUTION TO CARE
Attending Attestation: Dr. Gutierrez  I have personally performed a history and physical examination of the patient and discussed management with the resident as well as the patient.  I reviewed the resident's note and agree with the documented findings and plan of care.  I have authored and modified critical sections of the Provider Note, including but not limited to HPI, Physical Exam and MDM. Pt c h/o ETOH use d/o, seizures, presenting after possible seizure.  Endorses drinknig heavily today.  Will obtain iomaging as ordered, reassess once sober.  No e/o injuries or focal deficits on exam. Is otherwise well appearing.

## 2021-01-20 VITALS
SYSTOLIC BLOOD PRESSURE: 112 MMHG | TEMPERATURE: 98 F | DIASTOLIC BLOOD PRESSURE: 76 MMHG | OXYGEN SATURATION: 100 % | RESPIRATION RATE: 16 BRPM | HEART RATE: 89 BPM

## 2021-01-20 RX ORDER — THIAMINE MONONITRATE (VIT B1) 100 MG
100 TABLET ORAL ONCE
Refills: 0 | Status: COMPLETED | OUTPATIENT
Start: 2021-01-20 | End: 2021-01-20

## 2021-01-20 RX ORDER — FOLIC ACID 0.8 MG
1 TABLET ORAL ONCE
Refills: 0 | Status: COMPLETED | OUTPATIENT
Start: 2021-01-20 | End: 2021-01-20

## 2021-01-20 RX ADMIN — Medication 25 MILLIGRAM(S): at 00:51

## 2021-01-20 RX ADMIN — Medication 100 MILLIGRAM(S): at 00:51

## 2021-01-20 RX ADMIN — Medication 1 MILLIGRAM(S): at 00:51

## 2021-01-20 RX ADMIN — Medication 1 TABLET(S): at 00:51

## 2021-01-20 NOTE — ED ADULT NURSE REASSESSMENT NOTE - NS ED NURSE REASSESS COMMENT FT1
Patient A&OX4, resting comfortably. Denies A/V/T hallucinations. Denies HA. Answering questions appropriately and able to do serial additions. VS as noted.

## 2021-01-20 NOTE — ED ADULT NURSE REASSESSMENT NOTE - NS ED NURSE REASSESS COMMENT FT1
Break RN: Pt is sleeping, arousable at this time. Medications given as ordered, safety precautions in placed, VSS as noted. Awaiting to be clinically cleared for dispo.

## 2021-01-21 ENCOUNTER — EMERGENCY (EMERGENCY)
Facility: HOSPITAL | Age: 56
LOS: 1 days | Discharge: HOME | End: 2021-01-21
Attending: EMERGENCY MEDICINE | Admitting: EMERGENCY MEDICINE
Payer: COMMERCIAL

## 2021-01-21 VITALS
SYSTOLIC BLOOD PRESSURE: 107 MMHG | TEMPERATURE: 98 F | HEART RATE: 88 BPM | DIASTOLIC BLOOD PRESSURE: 72 MMHG | RESPIRATION RATE: 18 BRPM | OXYGEN SATURATION: 99 % | HEIGHT: 67 IN

## 2021-01-21 LAB
ALBUMIN SERPL ELPH-MCNC: 3.8 G/DL — SIGNIFICANT CHANGE UP (ref 3.3–5)
ALP SERPL-CCNC: 59 U/L — SIGNIFICANT CHANGE UP (ref 40–120)
ALT FLD-CCNC: 11 U/L — SIGNIFICANT CHANGE UP (ref 4–41)
ANION GAP SERPL CALC-SCNC: 15 MMOL/L — HIGH (ref 7–14)
AST SERPL-CCNC: 17 U/L — SIGNIFICANT CHANGE UP (ref 4–40)
BASE EXCESS BLDV CALC-SCNC: 1.2 MMOL/L — SIGNIFICANT CHANGE UP (ref -3–2)
BASOPHILS # BLD AUTO: 0.03 K/UL — SIGNIFICANT CHANGE UP (ref 0–0.2)
BASOPHILS NFR BLD AUTO: 0.4 % — SIGNIFICANT CHANGE UP (ref 0–2)
BILIRUB SERPL-MCNC: 0.2 MG/DL — SIGNIFICANT CHANGE UP (ref 0.2–1.2)
BLOOD GAS VENOUS - CREATININE: 1.3 MG/DL — SIGNIFICANT CHANGE UP (ref 0.5–1.3)
BLOOD GAS VENOUS COMPREHENSIVE RESULT: SIGNIFICANT CHANGE UP
BUN SERPL-MCNC: 20 MG/DL — SIGNIFICANT CHANGE UP (ref 7–23)
CALCIUM SERPL-MCNC: 9 MG/DL — SIGNIFICANT CHANGE UP (ref 8.4–10.5)
CHLORIDE BLDV-SCNC: 103 MMOL/L — SIGNIFICANT CHANGE UP (ref 96–108)
CHLORIDE SERPL-SCNC: 99 MMOL/L — SIGNIFICANT CHANGE UP (ref 98–107)
CO2 SERPL-SCNC: 23 MMOL/L — SIGNIFICANT CHANGE UP (ref 22–31)
CREAT SERPL-MCNC: 1.22 MG/DL — SIGNIFICANT CHANGE UP (ref 0.5–1.3)
EOSINOPHIL # BLD AUTO: 0.2 K/UL — SIGNIFICANT CHANGE UP (ref 0–0.5)
EOSINOPHIL NFR BLD AUTO: 2.9 % — SIGNIFICANT CHANGE UP (ref 0–6)
GAS PNL BLDV: 139 MMOL/L — SIGNIFICANT CHANGE UP (ref 136–146)
GLUCOSE BLDV-MCNC: 181 MG/DL — HIGH (ref 70–99)
GLUCOSE SERPL-MCNC: 185 MG/DL — HIGH (ref 70–99)
HCO3 BLDV-SCNC: 25 MMOL/L — SIGNIFICANT CHANGE UP (ref 20–27)
HCT VFR BLD CALC: 35.8 % — LOW (ref 39–50)
HCT VFR BLDA CALC: 38.3 % — LOW (ref 39–51)
HGB BLD CALC-MCNC: 12.4 G/DL — LOW (ref 13–17)
HGB BLD-MCNC: 11.6 G/DL — LOW (ref 13–17)
IANC: 3.55 K/UL — SIGNIFICANT CHANGE UP (ref 1.5–8.5)
IMM GRANULOCYTES NFR BLD AUTO: 0.1 % — SIGNIFICANT CHANGE UP (ref 0–1.5)
LACTATE BLDV-MCNC: 2.1 MMOL/L — HIGH (ref 0.5–2)
LIDOCAIN IGE QN: 42 U/L — SIGNIFICANT CHANGE UP (ref 7–60)
LYMPHOCYTES # BLD AUTO: 2.38 K/UL — SIGNIFICANT CHANGE UP (ref 1–3.3)
LYMPHOCYTES # BLD AUTO: 35.1 % — SIGNIFICANT CHANGE UP (ref 13–44)
MCHC RBC-ENTMCNC: 28.9 PG — SIGNIFICANT CHANGE UP (ref 27–34)
MCHC RBC-ENTMCNC: 32.4 GM/DL — SIGNIFICANT CHANGE UP (ref 32–36)
MCV RBC AUTO: 89.3 FL — SIGNIFICANT CHANGE UP (ref 80–100)
MONOCYTES # BLD AUTO: 0.61 K/UL — SIGNIFICANT CHANGE UP (ref 0–0.9)
MONOCYTES NFR BLD AUTO: 9 % — SIGNIFICANT CHANGE UP (ref 2–14)
NEUTROPHILS # BLD AUTO: 3.55 K/UL — SIGNIFICANT CHANGE UP (ref 1.8–7.4)
NEUTROPHILS NFR BLD AUTO: 52.5 % — SIGNIFICANT CHANGE UP (ref 43–77)
NRBC # BLD: 0 /100 WBCS — SIGNIFICANT CHANGE UP
NRBC # FLD: 0 K/UL — SIGNIFICANT CHANGE UP
PCO2 BLDV: 46 MMHG — SIGNIFICANT CHANGE UP (ref 41–51)
PH BLDV: 7.37 — SIGNIFICANT CHANGE UP (ref 7.32–7.43)
PLATELET # BLD AUTO: 323 K/UL — SIGNIFICANT CHANGE UP (ref 150–400)
PO2 BLDV: 49 MMHG — HIGH (ref 35–40)
POTASSIUM BLDV-SCNC: 3.3 MMOL/L — LOW (ref 3.4–4.5)
POTASSIUM SERPL-MCNC: 3.2 MMOL/L — LOW (ref 3.5–5.3)
POTASSIUM SERPL-SCNC: 3.2 MMOL/L — LOW (ref 3.5–5.3)
PROT SERPL-MCNC: 7.2 G/DL — SIGNIFICANT CHANGE UP (ref 6–8.3)
RBC # BLD: 4.01 M/UL — LOW (ref 4.2–5.8)
RBC # FLD: 14 % — SIGNIFICANT CHANGE UP (ref 10.3–14.5)
SAO2 % BLDV: 79 % — SIGNIFICANT CHANGE UP (ref 60–85)
SARS-COV-2 RNA SPEC QL NAA+PROBE: DETECTED
SODIUM SERPL-SCNC: 137 MMOL/L — SIGNIFICANT CHANGE UP (ref 135–145)
TROPONIN T, HIGH SENSITIVITY RESULT: 28 NG/L — SIGNIFICANT CHANGE UP
TROPONIN T, HIGH SENSITIVITY RESULT: 29 NG/L — SIGNIFICANT CHANGE UP
WBC # BLD: 6.78 K/UL — SIGNIFICANT CHANGE UP (ref 3.8–10.5)
WBC # FLD AUTO: 6.78 K/UL — SIGNIFICANT CHANGE UP (ref 3.8–10.5)

## 2021-01-21 PROCEDURE — 71250 CT THORAX DX C-: CPT | Mod: 26

## 2021-01-21 PROCEDURE — 99284 EMERGENCY DEPT VISIT MOD MDM: CPT

## 2021-01-21 PROCEDURE — 71045 X-RAY EXAM CHEST 1 VIEW: CPT | Mod: 26

## 2021-01-21 RX ORDER — SODIUM CHLORIDE 9 MG/ML
1000 INJECTION INTRAMUSCULAR; INTRAVENOUS; SUBCUTANEOUS ONCE
Refills: 0 | Status: COMPLETED | OUTPATIENT
Start: 2021-01-21 | End: 2021-01-21

## 2021-01-21 RX ORDER — HALOPERIDOL DECANOATE 100 MG/ML
5 INJECTION INTRAMUSCULAR ONCE
Refills: 0 | Status: COMPLETED | OUTPATIENT
Start: 2021-01-21 | End: 2021-01-21

## 2021-01-21 RX ORDER — ASPIRIN/CALCIUM CARB/MAGNESIUM 324 MG
162 TABLET ORAL ONCE
Refills: 0 | Status: COMPLETED | OUTPATIENT
Start: 2021-01-21 | End: 2021-01-21

## 2021-01-21 RX ADMIN — Medication 2 MILLIGRAM(S): at 17:45

## 2021-01-21 RX ADMIN — HALOPERIDOL DECANOATE 5 MILLIGRAM(S): 100 INJECTION INTRAMUSCULAR at 17:45

## 2021-01-21 RX ADMIN — SODIUM CHLORIDE 1000 MILLILITER(S): 9 INJECTION INTRAMUSCULAR; INTRAVENOUS; SUBCUTANEOUS at 19:04

## 2021-01-21 RX ADMIN — SODIUM CHLORIDE 1000 MILLILITER(S): 9 INJECTION INTRAMUSCULAR; INTRAVENOUS; SUBCUTANEOUS at 22:27

## 2021-01-21 RX ADMIN — Medication 162 MILLIGRAM(S): at 19:04

## 2021-01-21 NOTE — ED PROVIDER NOTE - OBJECTIVE STATEMENT
56yo male w pmh of HTN, DM and alcohol abuse disorder, pw intoxication and a fall. Pt was seen in the ED yesterday and DC'ed home. Pt states he went home and started drinking alcohol, 1 pint of lizette, 1 pint of beer, and smoked crack cocaine. BIBEMS after falling at a store. Pt endorses hitting the back of his head and 'losing consciousness for 2 hours.' Pt endorses chills and vomiting. Denies fever, chest pain, sob, any localized pain, nausea, auditory/ visual/tactile hallucinations.

## 2021-01-21 NOTE — ED PROVIDER NOTE - ATTENDING CONTRIBUTION TO CARE
adrian: pt presents intoxicated, 3 visits since december including 1/19.  pt had ct done then, no m/b/s.  pt here is able to move all extremities, says he lives in [place]  [place].  pt with cough, productive during the exam, bs bi-lateral.  will film and do labs and observe    I performed a history and physical exam of the patient and discussed their management with the resident and /or advanced care provider. I reviewed the resident and /or ACP's note and agree with the documented findings and plan of care. My medical decison making and observations are found above.

## 2021-01-21 NOTE — ED PROVIDER NOTE - NSFOLLOWUPINSTRUCTIONS_ED_ALL_ED_FT
You have COVID.     - Please follow up with your doctor. If you have issues obtaining follow up, please call: [phone] to obtain a doctor or specialist who takes your insurance in your area.   -Rest and stay well hydrated  -Take over the counter acetaminophen (Tylenol) 650-1000 mg every every 4-6 hours as needed for fever/pain. Do not take more than 4000 mg in a 24 hour period. Be aware many over the counter and prescription medications also contain acetaminophen (Tylenol).    For a 14 day period:  -Stay inside your home as much as possible, avoiding public places or public interaction  -Do not go to work  -If you do enter any public domain, at minimum wear a surgical mask at all times  -Even while indoors, attempt to remain isolated from other individuals such as family or friends, as much as possible  -Return to the Emergency Department for any symptoms such as worsening shortness of breath, significant worsening cough, high fevers despite over the counter fever-reducing medications, or severe weakness/malaise

## 2021-01-21 NOTE — ED PROVIDER NOTE - PSYCHIATRIC, MLM
Alert and oriented to person, place, time/situation. normal mood and affect. no apparent risk to self or others. Cooperative and calm, no auditory/visual/tactile hallucinations

## 2021-01-21 NOTE — ED PROVIDER NOTE - PATIENT PORTAL LINK FT
You can access the FollowMyHealth Patient Portal offered by [facility] by registering at the following website: [email or web address]. By joining [employer]’s FollowMyHealth portal, you will also be able to view your health information using other applications (apps) compatible with our system.

## 2021-01-21 NOTE — ED PROVIDER NOTE - CARE PLAN
Principal Discharge DX:	Alcoholic intoxication with complication   Principal Discharge DX:	Alcoholic intoxication with complication  Secondary Diagnosis:	COVID-19

## 2021-01-21 NOTE — ED ADULT NURSE NOTE - OBJECTIVE STATEMENT
Pt received to room 17a due to intoxication. AxOx4 at this time. As per triage note, pt brought to ED after witnessed fall due to intoxication. Pt denies hitting head, endorses headache/dizziness. Pt denies chest pain, SOB, nausea, vomiting, diarrhea, fever, chills, cough. Pt states "I drank a lot of lizette mann 151." Pt appears comfortable, in NAD, respirations even/unlabored. Pt appears intoxicated, slurred speech noted. Pt intermittently agitated. Belongings placed across rm 21. Awaiting eval and further orders, will continue to monitor. Pt received to room 17a due to intoxication. AxOx4 at this time. As per triage note, pt brought to ED after witnessed fall due to intoxication. Pt denies hitting head, endorses headache/dizziness, nausea/vomiting x 1hr ago. Pt denies chest pain, SOB, diarrhea, fever, cough. Pt states "I drank a lot of lizette mann 151." Pt appears comfortable, in NAD, respirations even/unlabored. Pt appears intoxicated, slurred speech noted. Pt intermittently agitated. Belongings placed across rm 21. Awaiting eval and further orders, will continue to monitor.

## 2021-01-21 NOTE — ED ADULT TRIAGE NOTE - CHIEF COMPLAINT QUOTE
)Patient brought to ER by EMS from the store after he was witnessed falling from being intoxicated. Pt is homeless, states he has not eaten and has been drinking alcohol.

## 2021-01-21 NOTE — ED PROVIDER NOTE - CPE EDP NEURO NORM
Pt was called and informed of blood work and had questions in regards to her good cholesterol being low. Pt has questions on what can be factors that contribute to it being low. Please advise    normal...

## 2021-01-21 NOTE — ED PROVIDER NOTE - TEMPLATE, MLM
Toxicology Nsaids Counseling: NSAID Counseling: I discussed with the patient that NSAIDs should be taken with food. Prolonged use of NSAIDs can result in the development of stomach ulcers.  Patient advised to stop taking NSAIDs if abdominal pain occurs.  The patient verbalized understanding of the proper use and possible adverse effects of NSAIDs.  All of the patient's questions and concerns were addressed.

## 2021-01-21 NOTE — ED PROVIDER NOTE - PROGRESS NOTE DETAILS
Joseph Frankel PGY2: Patient found to be covid positive. All other labs wnl. Not hypoxic. Satting 97% on RA. Lives in [place]. States he has somewhere to quarantine. Would like to go home. Discussed plan and return precautions with patient who understands and agrees. All questions answered.

## 2021-01-21 NOTE — ED PROVIDER NOTE - CLINICAL SUMMARY MEDICAL DECISION MAKING FREE TEXT BOX
54yo male w pmh of HTN, DM and alcohol abuse disorder, pw intoxication and a fall. Pt was seen in the ED yesterday and DC'ed home. Pt states he went home and started drinking alcohol, 1 pint of lizette, 1 pint of beer, and smoked crack cocaine. BIBEMS after falling at a store. Pt endorses hitting the back of his head and 'losing consciousness for 2 hours.' Pt endorses chills and vomiting. Denies fever, chest pain, sob, any localized pain, nausea, auditory/ visual/tactile hallucinations. Allow pt to eat and sleep. Observe for signs/symptoms of alcohol withdrawal. If pt consents get CBC and CMP. ?CT head 54yo male w pmh of HTN, DM and alcohol abuse disorder, pw intoxication and a fall. Pt was seen in the ED yesterday and DC'ed home. Pt states he went home and started drinking alcohol, 1 pint of lizette, 1 pint of beer, and smoked crack cocaine. BIBEMS after falling at a store. Pt endorses hitting the back of his head and 'losing consciousness for 2 hours.' Pt endorses chills and vomiting. Denies fever, chest pain, sob, any localized pain, nausea, auditory/ visual/tactile hallucinations. Allow pt to eat and sleep. Observe for signs/symptoms of alcohol withdrawal. If pt consents get CBC and CMP. ?CT head    haughey: pt presents intoxicated, 3 visits since december including 1/19.  pt had ct done then, no m/b/s.  pt here is able to move all extremities, says [place] in [place]  [place].  pt with cough, productive during the exam, bs bi-lateral.  will film and do labs and observe

## 2021-01-21 NOTE — ED ADULT NURSE REASSESSMENT NOTE - NS ED NURSE REASSESS COMMENT FT1
Pt became increasingly agitated when trying to collect and itemize valuables. ANM made aware. As per ANM, ok to leave valuables with pt at this time since he is currently sleeping calmly in bed/not an elopement risk. Clothing/shoes collected and placed across rm 21. Will continue to monitor.

## 2021-01-21 NOTE — ED PROVIDER NOTE - CONSTITUTIONAL, MLM
unkempt, awake, alert, oriented to person, place, time/situation and in no apparent distress. normal...

## 2021-01-22 ENCOUNTER — INPATIENT (INPATIENT)
Age: 56
LOS: 2 days | Discharge: HOME | End: 2021-01-25
Attending: INTERNAL MEDICINE | Admitting: INTERNAL MEDICINE
Payer: COMMERCIAL

## 2021-01-22 VITALS
DIASTOLIC BLOOD PRESSURE: 76 MMHG | HEART RATE: 88 BPM | TEMPERATURE: 98 F | OXYGEN SATURATION: 100 % | SYSTOLIC BLOOD PRESSURE: 124 MMHG | RESPIRATION RATE: 18 BRPM

## 2021-01-22 VITALS
SYSTOLIC BLOOD PRESSURE: 143 MMHG | OXYGEN SATURATION: 100 % | RESPIRATION RATE: 18 BRPM | HEART RATE: 72 BPM | TEMPERATURE: 98 F | DIASTOLIC BLOOD PRESSURE: 78 MMHG

## 2021-01-22 DIAGNOSIS — U07.1 COVID-19: ICD-10-CM

## 2021-01-22 DIAGNOSIS — R63.8 OTHER SYMPTOMS AND SIGNS CONCERNING FOOD AND FLUID INTAKE: ICD-10-CM

## 2021-01-22 DIAGNOSIS — Z29.9 ENCOUNTER FOR PROPHYLACTIC MEASURES, UNSPECIFIED: ICD-10-CM

## 2021-01-22 DIAGNOSIS — F10.10 ALCOHOL ABUSE, UNCOMPLICATED: ICD-10-CM

## 2021-01-22 DIAGNOSIS — I10 ESSENTIAL (PRIMARY) HYPERTENSION: ICD-10-CM

## 2021-01-22 DIAGNOSIS — E11.9 TYPE 2 DIABETES MELLITUS WITHOUT COMPLICATIONS: ICD-10-CM

## 2021-01-22 DIAGNOSIS — R55 SYNCOPE AND COLLAPSE: ICD-10-CM

## 2021-01-22 LAB
ALBUMIN SERPL ELPH-MCNC: 3.5 G/DL — SIGNIFICANT CHANGE UP (ref 3.3–5)
ALP SERPL-CCNC: 56 U/L — SIGNIFICANT CHANGE UP (ref 40–120)
ALT FLD-CCNC: 11 U/L — SIGNIFICANT CHANGE UP (ref 4–41)
ANION GAP SERPL CALC-SCNC: 10 MMOL/L — SIGNIFICANT CHANGE UP (ref 7–14)
ANION GAP SERPL CALC-SCNC: 8 MMOL/L — SIGNIFICANT CHANGE UP (ref 7–14)
AST SERPL-CCNC: 17 U/L — SIGNIFICANT CHANGE UP (ref 4–40)
BILIRUB DIRECT SERPL-MCNC: <0.2 MG/DL — SIGNIFICANT CHANGE UP (ref 0–0.2)
BILIRUB INDIRECT FLD-MCNC: >0.2 MG/DL — SIGNIFICANT CHANGE UP (ref 0–1)
BILIRUB SERPL-MCNC: 0.4 MG/DL — SIGNIFICANT CHANGE UP (ref 0.2–1.2)
BUN SERPL-MCNC: 13 MG/DL — SIGNIFICANT CHANGE UP (ref 7–23)
BUN SERPL-MCNC: 13 MG/DL — SIGNIFICANT CHANGE UP (ref 7–23)
CALCIUM SERPL-MCNC: 8.8 MG/DL — SIGNIFICANT CHANGE UP (ref 8.4–10.5)
CALCIUM SERPL-MCNC: 8.8 MG/DL — SIGNIFICANT CHANGE UP (ref 8.4–10.5)
CHLORIDE SERPL-SCNC: 105 MMOL/L — SIGNIFICANT CHANGE UP (ref 98–107)
CHLORIDE SERPL-SCNC: 106 MMOL/L — SIGNIFICANT CHANGE UP (ref 98–107)
CO2 SERPL-SCNC: 23 MMOL/L — SIGNIFICANT CHANGE UP (ref 22–31)
CO2 SERPL-SCNC: 24 MMOL/L — SIGNIFICANT CHANGE UP (ref 22–31)
CREAT SERPL-MCNC: 0.8 MG/DL — SIGNIFICANT CHANGE UP (ref 0.5–1.3)
CREAT SERPL-MCNC: 0.82 MG/DL — SIGNIFICANT CHANGE UP (ref 0.5–1.3)
ETHANOL SERPL-MCNC: <10 MG/DL — SIGNIFICANT CHANGE UP
GLUCOSE BLDC GLUCOMTR-MCNC: 163 MG/DL — HIGH (ref 70–99)
GLUCOSE SERPL-MCNC: 174 MG/DL — HIGH (ref 70–99)
GLUCOSE SERPL-MCNC: 176 MG/DL — HIGH (ref 70–99)
HCT VFR BLD CALC: 37.9 % — LOW (ref 39–50)
HGB BLD-MCNC: 11.8 G/DL — LOW (ref 13–17)
MAGNESIUM SERPL-MCNC: 1.7 MG/DL — SIGNIFICANT CHANGE UP (ref 1.6–2.6)
MCHC RBC-ENTMCNC: 28.5 PG — SIGNIFICANT CHANGE UP (ref 27–34)
MCHC RBC-ENTMCNC: 31.1 GM/DL — LOW (ref 32–36)
MCV RBC AUTO: 91.5 FL — SIGNIFICANT CHANGE UP (ref 80–100)
NRBC # BLD: 0 /100 WBCS — SIGNIFICANT CHANGE UP
NRBC # FLD: 0 K/UL — SIGNIFICANT CHANGE UP
PHOSPHATE SERPL-MCNC: 1.5 MG/DL — LOW (ref 2.5–4.5)
PLATELET # BLD AUTO: 328 K/UL — SIGNIFICANT CHANGE UP (ref 150–400)
POTASSIUM SERPL-MCNC: 4.2 MMOL/L — SIGNIFICANT CHANGE UP (ref 3.5–5.3)
POTASSIUM SERPL-MCNC: 4.2 MMOL/L — SIGNIFICANT CHANGE UP (ref 3.5–5.3)
POTASSIUM SERPL-SCNC: 4.2 MMOL/L — SIGNIFICANT CHANGE UP (ref 3.5–5.3)
POTASSIUM SERPL-SCNC: 4.2 MMOL/L — SIGNIFICANT CHANGE UP (ref 3.5–5.3)
PROT SERPL-MCNC: 7.1 G/DL — SIGNIFICANT CHANGE UP (ref 6–8.3)
RBC # BLD: 4.14 M/UL — LOW (ref 4.2–5.8)
RBC # FLD: 14.1 % — SIGNIFICANT CHANGE UP (ref 10.3–14.5)
SODIUM SERPL-SCNC: 138 MMOL/L — SIGNIFICANT CHANGE UP (ref 135–145)
SODIUM SERPL-SCNC: 138 MMOL/L — SIGNIFICANT CHANGE UP (ref 135–145)
TROPONIN T, HIGH SENSITIVITY RESULT: 23 NG/L — SIGNIFICANT CHANGE UP
WBC # BLD: 5.17 K/UL — SIGNIFICANT CHANGE UP (ref 3.8–10.5)
WBC # FLD AUTO: 5.17 K/UL — SIGNIFICANT CHANGE UP (ref 3.8–10.5)

## 2021-01-22 PROCEDURE — 70450 CT HEAD/BRAIN W/O DYE: CPT | Mod: 26

## 2021-01-22 PROCEDURE — 99223 1ST HOSP IP/OBS HIGH 75: CPT

## 2021-01-22 PROCEDURE — 99284 EMERGENCY DEPT VISIT MOD MDM: CPT

## 2021-01-22 RX ORDER — FOLIC ACID 0.8 MG
1 TABLET ORAL DAILY
Refills: 0 | Status: DISCONTINUED | OUTPATIENT
Start: 2021-01-22 | End: 2021-01-25

## 2021-01-22 RX ORDER — DEXTROSE 50 % IN WATER 50 %
25 SYRINGE (ML) INTRAVENOUS ONCE
Refills: 0 | Status: DISCONTINUED | OUTPATIENT
Start: 2021-01-22 | End: 2021-01-25

## 2021-01-22 RX ORDER — THIAMINE MONONITRATE (VIT B1) 100 MG
100 TABLET ORAL EVERY 24 HOURS
Refills: 0 | Status: DISCONTINUED | OUTPATIENT
Start: 2021-01-22 | End: 2021-01-25

## 2021-01-22 RX ORDER — DEXTROSE 50 % IN WATER 50 %
12.5 SYRINGE (ML) INTRAVENOUS ONCE
Refills: 0 | Status: DISCONTINUED | OUTPATIENT
Start: 2021-01-22 | End: 2021-01-25

## 2021-01-22 RX ORDER — SODIUM CHLORIDE 9 MG/ML
1000 INJECTION, SOLUTION INTRAVENOUS
Refills: 0 | Status: DISCONTINUED | OUTPATIENT
Start: 2021-01-22 | End: 2021-01-25

## 2021-01-22 RX ORDER — SODIUM CHLORIDE 9 MG/ML
1000 INJECTION INTRAMUSCULAR; INTRAVENOUS; SUBCUTANEOUS ONCE
Refills: 0 | Status: COMPLETED | OUTPATIENT
Start: 2021-01-22 | End: 2021-01-22

## 2021-01-22 RX ORDER — ENOXAPARIN SODIUM 100 MG/ML
40 INJECTION SUBCUTANEOUS EVERY 24 HOURS
Refills: 0 | Status: DISCONTINUED | OUTPATIENT
Start: 2021-01-22 | End: 2021-01-25

## 2021-01-22 RX ORDER — GLUCAGON INJECTION, SOLUTION 0.5 MG/.1ML
1 INJECTION, SOLUTION SUBCUTANEOUS ONCE
Refills: 0 | Status: DISCONTINUED | OUTPATIENT
Start: 2021-01-22 | End: 2021-01-25

## 2021-01-22 RX ORDER — DEXTROSE 50 % IN WATER 50 %
15 SYRINGE (ML) INTRAVENOUS ONCE
Refills: 0 | Status: DISCONTINUED | OUTPATIENT
Start: 2021-01-22 | End: 2021-01-25

## 2021-01-22 RX ORDER — INSULIN LISPRO 100/ML
VIAL (ML) SUBCUTANEOUS
Refills: 0 | Status: DISCONTINUED | OUTPATIENT
Start: 2021-01-22 | End: 2021-01-25

## 2021-01-22 RX ORDER — POTASSIUM CHLORIDE 20 MEQ
40 PACKET (EA) ORAL ONCE
Refills: 0 | Status: COMPLETED | OUTPATIENT
Start: 2021-01-22 | End: 2021-01-22

## 2021-01-22 RX ADMIN — SODIUM CHLORIDE 1000 MILLILITER(S): 9 INJECTION INTRAMUSCULAR; INTRAVENOUS; SUBCUTANEOUS at 02:35

## 2021-01-22 RX ADMIN — Medication 100 MILLIGRAM(S): at 18:02

## 2021-01-22 RX ADMIN — SODIUM CHLORIDE 500 MILLILITER(S): 9 INJECTION INTRAMUSCULAR; INTRAVENOUS; SUBCUTANEOUS at 08:00

## 2021-01-22 RX ADMIN — SODIUM CHLORIDE 1000 MILLILITER(S): 9 INJECTION INTRAMUSCULAR; INTRAVENOUS; SUBCUTANEOUS at 11:13

## 2021-01-22 RX ADMIN — Medication 1 MILLIGRAM(S): at 18:02

## 2021-01-22 RX ADMIN — Medication 1 TABLET(S): at 18:02

## 2021-01-22 RX ADMIN — Medication 40 MILLIEQUIVALENT(S): at 02:35

## 2021-01-22 NOTE — ED STATDOCS - OBJECTIVE STATEMENT
54 yo M seen in ED earlier in the day for intoxication and a fall, presents to Pediatric Emergency department after stating he fell again. sleepy but arousable, VS satble, HR 88, /76, O2 sat 100% on RA, discussed with Dr. Jean and will send to ED for further eval, Soham Vick MD

## 2021-01-22 NOTE — ED ADULT NURSE NOTE - OBJECTIVE STATEMENT
Pt awake and alert but confused - oriented x 1.   Pt presents s/p multiple syncopal episodes/falls with history of ETOH.   Pt on continuous cardiac monitor and pulse ox with NSR And VSS.    IV site unremarkable.   Pt denies any chest pain, SOB, any dizziness or headache.   Respirations even and unlabored.   No acute distress noted.   Pt tolerating PO.   awaiting inpatient bed.

## 2021-01-22 NOTE — H&P ADULT - NSICDXPASTMEDICALHX_GEN_ALL_CORE_FT
PAST MEDICAL HISTORY:  HTN (hypertension)     No pertinent past medical history     Type 2 diabetes mellitus without complication, unspecified whether long term insulin use

## 2021-01-22 NOTE — ED ADULT TRIAGE NOTE - CHIEF COMPLAINT QUOTE
Patient discharged from [facility] about 2 hours ago, brought in by [facility]'s ER nurses s/p falling outside. Patient stating he tripped and fell, hit head with LOC. Denies headache, dizziness, double vision and lightheadedness at this time. Patient admits to drinking liquor, unsure of amount about 1 hour ago. Endorses history of drug use. Patient nodding off during interview questions, has no complaints at this time. Hx of HTN and DM, patient tested Covid+ yesterday. Patient discharged from [facility] about 2 hours ago, brought in by [facility] ER nurses s/p falling outside. Patient stating he tripped and fell, hit head with LOC. Denies headache, dizziness, double vision and lightheadedness at this time. Patient admits to drinking liquor, unsure of amount about 1 hour ago. Endorses history of drug use. Patient nodding off during interview questions, has no complaints at this time. Hx of HTN and DM, patient tested Covid+ yesterday. Belongings checked and secured, placed in  low acuity.

## 2021-01-22 NOTE — PROVIDER CONTACT NOTE (OTHER) - BACKGROUND
Resident Toi consulted MARIA ELENA for pt possibly being homeless and COVID+. MARIA ELENA met with pt at bedside who stated he is from [place].

## 2021-01-22 NOTE — ED PEDIATRIC TRIAGE NOTE - CHIEF COMPLAINT QUOTE
pt with PMH of hypertension came in stating that he fell while walking outside, pt states he his head, states he had +LOC, pt appears altered in triage, no bogginess or deformities noted. Pt was seen on adult and discharged today for EtOH intoxication. pt is COVID + pt with PMH of hypertension came in stating that he fell while walking outside, pt states he his head, states he had +LOC, pt appears altered in triage, no bogginess or deformities noted. Pt was seen at [facility] and discharged today at 200am for EtOH intoxication. pt is COVID +

## 2021-01-22 NOTE — H&P ADULT - HISTORY OF PRESENT ILLNESS
56 y/o M with PMH HTN and EtOH abuse presents for a fall. He was seen in the ED x2 days and sent home. On 1/19, he was brought in by EMS after a passer by called 911 for a seizure. On 1/20, he returned after having a fall. He now returns for passing out at home.  54 y/o M with PMH HTN and EtOH abuse presents for a fall. He was seen in the ED x2 days and sent home. On 1/19, he was brought in by EMS after a passer by called 911 for a seizure. On 1/20, he returned after having a fall. He now returns for passing out at home. He reports that he has been experiencing fever, cough, and SOB over the past few days. He also said he fell. He has been drinking a lot of alcohol. He drinks beer and 1.5 pints of Zack Jaramillo/vodka every day. He has gone through withdrawal in the past, but has never been intubated. Cannot recall if he has had DTs. He has required ativan/librium for withdrawal symptoms. He also uses cocaine, which he last used 3 days ago. He currently denies HA, nausea/vomiting, anxiety, tremors, diaphoresis, auditory/visual hallucinations. Pt reports he takes a blood pressure medication, but doesn't know what it is.  56 y/o M with PMH HTN and EtOH abuse presents for a fall. He was seen in the ED x2 days and sent home. On 1/19, he was brought in by EMS after a passer by called 911 for a seizure. On 1/20, he returned after having a fall. He now returns for passing out at home. He reports that he has been experiencing fever, cough, and SOB over the past few days. He also said he fell. He has been drinking a lot of alcohol. He drinks beer and 1.5 pints of Zack Jaramillo/vodka every day. When asked about his last drink, he says he did not drink today. He has gone through withdrawal in the past, but has never been intubated. Cannot recall if he has had DTs. He has required ativan/librium for withdrawal symptoms. He also uses cocaine, which he last used 3 days ago. He currently denies HA, nausea/vomiting, anxiety, tremors, diaphoresis, auditory/visual hallucinations. Pt reports he takes a blood pressure medication, but doesn't know what it is.

## 2021-01-22 NOTE — ED PROVIDER NOTE - CLINICAL SUMMARY MEDICAL DECISION MAKING FREE TEXT BOX
Kera: Seen in ED several times over the last few days for ETOH use. Found to have COVID. Was discharged a short time ago; apparently, he lives in [place] and was going to get himself to [place]. Returns here b/c he said he syncopized just after leaving here. Said he had antecedent CP and SOB. Check CT brain, EKG, and trop. Admit for syncope w/u.

## 2021-01-22 NOTE — H&P ADULT - NSHPREVIEWOFSYSTEMS_GEN_ALL_CORE
REVIEW OF SYSTEMS:    CONSTITUTIONAL: (+) fevers/weakness   EYES/ENT: No visual changes;  No vertigo or throat pain   NECK: No pain or stiffness  RESPIRATORY: (+) cough/SOB, no wheezing, hemoptysis; No shortness of breath  CARDIOVASCULAR: No chest pain or palpitations  GASTROINTESTINAL: No abdominal or epigastric pain. No nausea, vomiting, or hematemesis; No diarrhea or constipation. No melena or hematochezia.  GENITOURINARY: No dysuria, frequency or hematuria  NEUROLOGICAL: No numbness or weakness  SKIN: No itching, rashes

## 2021-01-22 NOTE — H&P ADULT - NSHPLABSRESULTS_GEN_ALL_CORE
.  LABS:                         11.6   6.78  )-----------( 323      ( 21 Jan 2021 18:58 )             35.8     01-21    137  |  99  |  20  ----------------------------<  185<H>  3.2<L>   |  23  |  1.22    Ca    9.0      21 Jan 2021 18:58    TPro  7.2  /  Alb  3.8  /  TBili  0.2  /  DBili  x   /  AST  17  /  ALT  11  /  AlkPhos  59  01-21                  RADIOLOGY, EKG & ADDITIONAL TESTS: Reviewed.

## 2021-01-22 NOTE — ED STATDOCS - INTERNATIONAL TRAVEL
Procedures       PROCEDURE PERFORMED: Botulinum toxin injection (50735)    CLINICAL INDICATION: G43.719    A time out was conducted just before the start of the procedure to verify the correct patient and procedure, procedure location, and all relevant critical information.     Conventional methods of treatment such as multiple medications , both on and   off label have been tried including: anti-epileptics (topiramate, gabapentin, diamox), beta blockers (metoprolol),  and antidepressants (paxil - cannot trial additional ones due to being on Paxil). The patient has been unresponsive and refractory.The patient meets criteria for chronic headaches according to the ICHD-II, the patient has more than 15 headaches a month which last for more than 4 hours a day.    Injection shows session number: 8  Percentage relief since last session: >50% of migraine relief     DESCRIPTION OF PROCEDURE: After obtaining informed consent and under   aseptic technique, a total of 135 units of botulinum toxin type A were   injected in the following muscles:     -- Procerus 5 units  --  5 units bilaterally  -- Frontalis 20 units  -- Temporalis 20 units bilaterally  -- Occipitalis 15 units bilaterally  -- Trapezius 15 units bilaterally.     -- Upper cervical paraspinals 10 units bilaterally spared due to prior surgery in this area     The patient tolerated the procedure well. There were no complications. The patient was given a prescription for repeat treatment in 12 weeks     Unavoidable waste - 65 units     Flory Chamorro MD    (few units were given around the shunt site)    Eye doctor called her, could not reach her. She feels her visions in not as bad as before, does not think she needs appt any more      
Unable to Assess

## 2021-01-22 NOTE — ED PROVIDER NOTE - PROGRESS NOTE DETAILS
[place]a, PGY-2: Pt reassessed multiple times in the past several hours. Doing well, sleeping/resting comfortably, answering all questions, following commands, CTH negative, will admit to medicine for syncope workup. patient ambulatory at this time.

## 2021-01-22 NOTE — H&P ADULT - PROBLEM SELECTOR PLAN 1
Pt with EtOH abuse. Drinks 3-4 beers and 1.5 pints of hard liquor daily for many years. He has attempted detox before but drinks again because of the death of his parents. Reporting he wants rehab. Currently, CIWA is 0. High risk for withdrawal. Has not required benzos thus far.  - Ativan symptom triggered CIWA protocol  - Pending Utox and blood alcohol level  - Folate, MV, thiamine

## 2021-01-22 NOTE — PROVIDER CONTACT NOTE (OTHER) - ASSESSMENT
SW asked how he ended up in [place] pt replied he comes to [place] often to visit friends. SW asked pt where he lives in [place] he stated he stays with family. SW explained to pt he is COVID+ and needs to isolate. Pt stated he can isolate at home and [place] the train between [place]. Pt is in agreement with being d/c'ed and returning home to [place]. No further SW needs at this time.

## 2021-01-22 NOTE — ED ADULT NURSE NOTE - NSIMPLEMENTINTERV_GEN_ALL_ED
Implemented All Fall with Harm Risk Interventions:  [place] to call system. Call bell, personal items and telephone within reach. Instruct patient to call for assistance. Room bathroom lighting operational. Non-slip footwear when patient is off stretcher. Physically safe environment: no spills, clutter or unnecessary equipment. Stretcher in lowest position, wheels locked, appropriate side rails in place. Provide visual cue, wrist band, yellow gown, etc. Monitor gait and stability. Monitor for mental status changes and reorient to person, place, and time. Review medications for side effects contributing to fall risk. Reinforce activity limits and safety measures with patient and family. Provide visual clues: red socks.

## 2021-01-22 NOTE — H&P ADULT - NSHPPHYSICALEXAM_GEN_ALL_CORE
.  VITAL SIGNS:  T(C): 36.7 (01-22-21 @ 06:30), Max: 36.8 (01-21-21 @ 15:35)  T(F): 98.1 (01-22-21 @ 06:30), Max: 98.2 (01-21-21 @ 15:35)  HR: 76 (01-22-21 @ 11:06) (67 - 88)  BP: 146/78 (01-22-21 @ 11:06) (107/72 - 146/78)  BP(mean): --  RR: 16 (01-22-21 @ 11:06) (16 - 18)  SpO2: 99% (01-22-21 @ 11:06) (99% - 100%)  Wt(kg): --    PHYSICAL EXAM:    Constitutional: WDWN male resting comfortably in bed; NAD  Head: NC/AT  Eyes: PERRL, EOMI, anicteric sclera  ENT: no nasal discharge; uvula midline, no oropharyngeal erythema or exudates; MMM  Neck: supple; no JVD or thyromegaly  Respiratory: CTA B/L; no W/R/R, no retractions  Cardiac: +S1/S2; RRR; no M/R/G; PMI non-displaced  Gastrointestinal: abdomen soft, NT/ND; no rebound or guarding; +BSx4  Back: spine midline, no bony tenderness or step-offs; no CVAT B/L  Extremities: WWP, no clubbing or cyanosis; no peripheral edema  Musculoskeletal: NROM x4; no joint swelling, tenderness or erythema  Vascular: 2+ radial, femoral, DP/PT pulses B/L  Dermatologic: skin warm, dry and intact; no rashes, wounds, or scars  Lymphatic: no submandibular or cervical LAD  Neurologic: AAOx3; CNII-XII grossly intact; no focal deficits  Psychiatric: affect and characteristics of appearance, verbalizations, behaviors are appropriate

## 2021-01-22 NOTE — ED PROVIDER NOTE - OBJECTIVE STATEMENT
Kera: Seen in ED several times over the last few days for ETOH use. Found to have COVID. Was discharged a short time ago; apparently, he lives in [place] and was going to get himself to [place]. Returns here b/c he said he syncopized just after leaving here. Said he had antecedent CP and SOB.

## 2021-01-22 NOTE — ED ADULT NURSE NOTE - CHIEF COMPLAINT QUOTE
Patient discharged from [facility] about 2 hours ago, brought in by [facility] nurses s/p falling outside. Patient stating he tripped and fell, hit head with LOC. Denies headache, dizziness, double vision and lightheadedness at this time. Patient admits to drinking liquor, unsure of amount about 1 hour ago. Endorses history of drug use. Patient nodding off during interview questions, has no complaints at this time. Hx of HTN and DM, patient tested Covid+ yesterday. Belongings checked and secured, placed in  low acuity.

## 2021-01-22 NOTE — PATIENT PROFILE ADULT - NSTRANSFERBELONGINGSDISPO_GEN_A_NUR
[Alert] : alert [Well Nourished] : well nourished [No Acute Distress] : no acute distress [Healthy Appearance] : healthy appearance [Normal Pupil & Iris Size/Symmetry] : normal pupil and iris size and symmetry [Well Developed] : well developed [Sclera Not Icteric] : sclera not icteric [No Photophobia] : no photophobia [No Discharge] : no discharge [Normal TMs] : both tympanic membranes were normal [Normal Lips/Tongue] : the lips and tongue were normal [Normal Outer Ear/Nose] : the ears and nose were normal in appearance [Normal Dentition] : normal dentition [Normal Tonsils] : normal tonsils [No Thrush] : no thrush [Boggy Nasal Turbinates] : boggy and/or pale nasal turbinates [No Oral Lesions or Ulcers] : no oral lesions or ulcers [Posterior Pharyngeal Cobblestoning] : posterior pharyngeal cobblestoning [No Neck Mass] : no neck mass was observed [No LAD] : no lymphadenopathy [Supple] : the neck was supple [Normal Rate and Effort] : normal respiratory rhythm and effort [No Retractions] : no retractions [No Crackles] : no crackles [Normal Rate] : heart rate was normal  [Bilateral Audible Breath Sounds] : bilateral audible breath sounds [No murmur] : no murmur [Normal S1, S2] : normal S1 and S2 [Regular Rhythm] : with a regular rhythm [Not Tender] : non-tender [Soft] : abdomen soft [Not Distended] : not distended [Skin Intact] : skin intact  [Normal Cervical Lymph Nodes] : cervical [No Skin Lesions] : no skin lesions [No Rash] : no rash [No Cyanosis] : no cyanosis [Normal Mood] : mood was normal [Normal Affect] : affect was normal with patient [Alert, Awake, Oriented as Age-Appropriate] : alert, awake, oriented as age appropriate [Conjunctival Erythema] : no conjunctival erythema [Pharyngeal erythema] : no pharyngeal erythema [Clear Rhinorrhea] : no clear rhinorrhea was seen [Exudate] : no exudate [Wheezing] : no wheezing was heard [Eczematous Patches] : no eczematous patches [Xerosis] : no xerosis

## 2021-01-22 NOTE — H&P ADULT - PROBLEM SELECTOR PLAN 2
Pt reporting fevers/chills, cough, and SOB. Appears asymptomatic. Afebrile here. On exam, no cough and lungs clear. CXR from 1/21 clear.   - Will monitor for now   - Does not meet criteria for EUA of monoclonal antibodies as he is asymptomatic

## 2021-01-23 LAB
A1C WITH ESTIMATED AVERAGE GLUCOSE RESULT: 9.1 % — HIGH (ref 4–5.6)
ANION GAP SERPL CALC-SCNC: 11 MMOL/L — SIGNIFICANT CHANGE UP (ref 7–14)
BUN SERPL-MCNC: 11 MG/DL — SIGNIFICANT CHANGE UP (ref 7–23)
CALCIUM SERPL-MCNC: 9.1 MG/DL — SIGNIFICANT CHANGE UP (ref 8.4–10.5)
CHLORIDE SERPL-SCNC: 101 MMOL/L — SIGNIFICANT CHANGE UP (ref 98–107)
CO2 SERPL-SCNC: 26 MMOL/L — SIGNIFICANT CHANGE UP (ref 22–31)
CREAT SERPL-MCNC: 0.82 MG/DL — SIGNIFICANT CHANGE UP (ref 0.5–1.3)
ESTIMATED AVERAGE GLUCOSE: 214 MG/DL — HIGH (ref 68–114)
GLUCOSE BLDC GLUCOMTR-MCNC: 130 MG/DL — HIGH (ref 70–99)
GLUCOSE BLDC GLUCOMTR-MCNC: 172 MG/DL — HIGH (ref 70–99)
GLUCOSE BLDC GLUCOMTR-MCNC: 222 MG/DL — HIGH (ref 70–99)
GLUCOSE SERPL-MCNC: 141 MG/DL — HIGH (ref 70–99)
MAGNESIUM SERPL-MCNC: 1.6 MG/DL — SIGNIFICANT CHANGE UP (ref 1.6–2.6)
PHOSPHATE SERPL-MCNC: 2.3 MG/DL — LOW (ref 2.5–4.5)
POTASSIUM SERPL-MCNC: 3.9 MMOL/L — SIGNIFICANT CHANGE UP (ref 3.5–5.3)
POTASSIUM SERPL-SCNC: 3.9 MMOL/L — SIGNIFICANT CHANGE UP (ref 3.5–5.3)
SODIUM SERPL-SCNC: 138 MMOL/L — SIGNIFICANT CHANGE UP (ref 135–145)

## 2021-01-23 PROCEDURE — 99233 SBSQ HOSP IP/OBS HIGH 50: CPT

## 2021-01-23 RX ORDER — SODIUM,POTASSIUM PHOSPHATES 278-250MG
1 POWDER IN PACKET (EA) ORAL DAILY
Refills: 0 | Status: COMPLETED | OUTPATIENT
Start: 2021-01-23 | End: 2021-01-25

## 2021-01-23 RX ADMIN — Medication 1 TABLET(S): at 11:16

## 2021-01-23 RX ADMIN — Medication 1 MILLIGRAM(S): at 11:16

## 2021-01-23 RX ADMIN — Medication 100 MILLIGRAM(S): at 17:14

## 2021-01-23 RX ADMIN — Medication 1: at 17:14

## 2021-01-23 RX ADMIN — ENOXAPARIN SODIUM 40 MILLIGRAM(S): 100 INJECTION SUBCUTANEOUS at 17:14

## 2021-01-23 RX ADMIN — Medication 5: at 08:47

## 2021-01-23 NOTE — PROGRESS NOTE ADULT - SUBJECTIVE AND OBJECTIVE BOX
Nakia Lu MD   [facility]  Cell: [phone]    Patient is a 55y old  Male who presents with a chief complaint of Fevers (22 Jan 2021 13:48)      INTERVAL HPI/OVERNIGHT EVENTS:    MEDICATIONS  (STANDING):  dextrose 40% Gel 15 Gram(s) Oral once  dextrose 5%. 1000 milliLiter(s) (50 mL/Hr) IV Continuous <Continuous>  dextrose 5%. 1000 milliLiter(s) (100 mL/Hr) IV Continuous <Continuous>  dextrose 50% Injectable 25 Gram(s) IV Push once  dextrose 50% Injectable 12.5 Gram(s) IV Push once  dextrose 50% Injectable 25 Gram(s) IV Push once  enoxaparin Injectable 40 milliGRAM(s) SubCutaneous every 24 hours  folic acid 1 milliGRAM(s) Oral daily  glucagon  Injectable 1 milliGRAM(s) IntraMuscular once  insulin lispro (ADMELOG) corrective regimen sliding scale   SubCutaneous three times a day before meals  multivitamin 1 Tablet(s) Oral daily  potassium phosphate / sodium phosphate Powder (PHOS-NaK) 1 Packet(s) Oral daily  thiamine 100 milliGRAM(s) Oral every 24 hours    MEDICATIONS  (PRN):      Allergies    No Known Allergies    Intolerances        REVIEW OF SYSTEMS:  Please see interval HPI:    Vital Signs Last 24 Hrs  T(C): 37.1 (23 Jan 2021 20:03), Max: 37.4 (23 Jan 2021 13:49)  T(F): 98.7 (23 Jan 2021 20:03), Max: 99.3 (23 Jan 2021 13:49)  HR: 78 (23 Jan 2021 20:03) (76 - 82)  BP: 129/77 (23 Jan 2021 20:03) (127/71 - 136/65)  BP(mean): --  RR: 18 (23 Jan 2021 20:03) (18 - 19)  SpO2: 100% (23 Jan 2021 20:03) (100% - 100%)  I&O's Detail        PHYSICAL EXAM:  Vital Signs Last 24 Hrs  T(C): 37.1 (23 Jan 2021 20:03), Max: 37.4 (23 Jan 2021 13:49)  T(F): 98.7 (23 Jan 2021 20:03), Max: 99.3 (23 Jan 2021 13:49)  HR: 78 (23 Jan 2021 20:03) (76 - 82)  BP: 129/77 (23 Jan 2021 20:03) (127/71 - 136/65)  BP(mean): --  RR: 18 (23 Jan 2021 20:03) (18 - 19)  SpO2: 100% (23 Jan 2021 20:03) (100% - 100%)  CONSTITUTIONAL: NAD, well-developed, well-groomed  ENMT: Moist oral mucosa, no pharyngeal injection or exudates; normal dentition  RESPIRATORY: Normal respiratory effort; lungs are clear to auscultation bilaterally  CARDIOVASCULAR: Regular rate and rhythm, normal S1 and S2, no murmur/rub/gallop; No lower extremity edema; Peripheral pulses are 2+ bilaterally  ABDOMEN: Nontender to palpation, normoactive bowel sounds, no rebound/guarding; No hepatosplenomegaly  PSYCH: A+O to person, place, and time; affect appropriate  EXT: no edema b/l  NEUROLOGY: CN 2-12 are intact and symmetric; no gross sensory deficits   SKIN: No rashes; no palpable lesions      LABS:    23 Jan 2021 15:17    138    |  101    |  11     ----------------------------<  141    3.9     |  26     |  0.82     Ca    9.1        23 Jan 2021 15:17  Phos  2.3       23 Jan 2021 15:17  Mg     1.6       23 Jan 2021 15:17        CAPILLARY BLOOD GLUCOSE      POCT Blood Glucose.: 222 mg/dL (23 Jan 2021 20:31)  POCT Blood Glucose.: 172 mg/dL (23 Jan 2021 17:06)  POCT Blood Glucose.: 130 mg/dL (23 Jan 2021 11:39)  POCT Blood Glucose.: 163 mg/dL (22 Jan 2021 21:34)    BLOOD CULTURE    RADIOLOGY & ADDITIONAL TESTS:    Imaging Personally Reviewed:  [ ] YES     Consultant(s) Notes Reviewed:      Care Discussed with Consultants/Other Providers: Nakia Lu MD   [facility]  Cell: [phone]    Patient is a 55y old  Male who presents with a chief complaint of Fevers (22 Jan 2021 13:48)      INTERVAL HPI/OVERNIGHT EVENTS: No events overnight. Pt denies any CP, SOB, n/v. CIWA scores have been <3. Pt reports he can't remember the last time he's withdrawn but per report he has required ativan/librium before. No intubations. no hx of seizures in the past.     MEDICATIONS  (STANDING):  dextrose 40% Gel 15 Gram(s) Oral once  dextrose 5%. 1000 milliLiter(s) (50 mL/Hr) IV Continuous <Continuous>  dextrose 5%. 1000 milliLiter(s) (100 mL/Hr) IV Continuous <Continuous>  dextrose 50% Injectable 25 Gram(s) IV Push once  dextrose 50% Injectable 12.5 Gram(s) IV Push once  dextrose 50% Injectable 25 Gram(s) IV Push once  enoxaparin Injectable 40 milliGRAM(s) SubCutaneous every 24 hours  folic acid 1 milliGRAM(s) Oral daily  glucagon  Injectable 1 milliGRAM(s) IntraMuscular once  insulin lispro (ADMELOG) corrective regimen sliding scale   SubCutaneous three times a day before meals  multivitamin 1 Tablet(s) Oral daily  potassium phosphate / sodium phosphate Powder (PHOS-NaK) 1 Packet(s) Oral daily  thiamine 100 milliGRAM(s) Oral every 24 hours    MEDICATIONS  (PRN):      Allergies    No Known Allergies    Intolerances        REVIEW OF SYSTEMS:  Please see interval HPI:    Vital Signs Last 24 Hrs  T(C): 37.1 (23 Jan 2021 20:03), Max: 37.4 (23 Jan 2021 13:49)  T(F): 98.7 (23 Jan 2021 20:03), Max: 99.3 (23 Jan 2021 13:49)  HR: 78 (23 Jan 2021 20:03) (76 - 82)  BP: 129/77 (23 Jan 2021 20:03) (127/71 - 136/65)  BP(mean): --  RR: 18 (23 Jan 2021 20:03) (18 - 19)  SpO2: 100% (23 Jan 2021 20:03) (100% - 100%)  I&O's Detail        PHYSICAL EXAM:  Vital Signs Last 24 Hrs  T(C): 37.1 (23 Jan 2021 20:03), Max: 37.4 (23 Jan 2021 13:49)  T(F): 98.7 (23 Jan 2021 20:03), Max: 99.3 (23 Jan 2021 13:49)  HR: 78 (23 Jan 2021 20:03) (76 - 82)  BP: 129/77 (23 Jan 2021 20:03) (127/71 - 136/65)  BP(mean): --  RR: 18 (23 Jan 2021 20:03) (18 - 19)  SpO2: 100% (23 Jan 2021 20:03) (100% - 100%)  CONSTITUTIONAL: NAD,   ENMT: Moist oral mucosa, no pharyngeal injection or exudates; normal dentition  RESPIRATORY: Normal respiratory effort; lungs are clear to auscultation bilaterally  CARDIOVASCULAR: Regular rate and rhythm, normal S1 and S2, no murmur/rub/gallop; No lower extremity edema;   ABDOMEN: Nontender to palpation, normoactive bowel sounds, no rebound/guarding; No hepatosplenomegaly  PSYCH: A+O to person, place, and time; affect appropriate  EXT: no edema b/l, no tremors noted  NEUROLOGY: moving all extremities, following commands  SKIN: No rashes; no palpable lesions      LABS:    23 Jan 2021 15:17    138    |  101    |  11     ----------------------------<  141    3.9     |  26     |  0.82     Ca    9.1        23 Jan 2021 15:17  Phos  2.3       23 Jan 2021 15:17  Mg     1.6       23 Jan 2021 15:17        CAPILLARY BLOOD GLUCOSE      POCT Blood Glucose.: 222 mg/dL (23 Jan 2021 20:31)  POCT Blood Glucose.: 172 mg/dL (23 Jan 2021 17:06)  POCT Blood Glucose.: 130 mg/dL (23 Jan 2021 11:39)  POCT Blood Glucose.: 163 mg/dL (22 Jan 2021 21:34)    BLOOD CULTURE    RADIOLOGY & ADDITIONAL TESTS:    Imaging Personally Reviewed:  [ ] YES     Consultant(s) Notes Reviewed:      Care Discussed with Consultants/Other Providers:

## 2021-01-24 DIAGNOSIS — R55 SYNCOPE AND COLLAPSE: ICD-10-CM

## 2021-01-24 LAB
GLUCOSE BLDC GLUCOMTR-MCNC: 126 MG/DL — HIGH (ref 70–99)
GLUCOSE BLDC GLUCOMTR-MCNC: 186 MG/DL — HIGH (ref 70–99)
GLUCOSE BLDC GLUCOMTR-MCNC: 244 MG/DL — HIGH (ref 70–99)
HCV AB S/CO SERPL IA: 0.15 S/CO — SIGNIFICANT CHANGE UP (ref 0–0.99)
HCV AB SERPL-IMP: SIGNIFICANT CHANGE UP
SARS-COV-2 IGG SERPL QL IA: POSITIVE
SARS-COV-2 IGM SERPL IA-ACNC: 8.14 INDEX — HIGH

## 2021-01-24 PROCEDURE — 99233 SBSQ HOSP IP/OBS HIGH 50: CPT

## 2021-01-24 RX ORDER — METFORMIN HYDROCHLORIDE 850 MG/1
500 TABLET ORAL
Refills: 0 | Status: DISCONTINUED | OUTPATIENT
Start: 2021-01-25 | End: 2021-01-25

## 2021-01-24 RX ADMIN — Medication 1 PACKET(S): at 12:53

## 2021-01-24 RX ADMIN — Medication 1 TABLET(S): at 12:53

## 2021-01-24 RX ADMIN — Medication 1: at 09:44

## 2021-01-24 RX ADMIN — Medication 100 MILLIGRAM(S): at 17:29

## 2021-01-24 RX ADMIN — Medication 2: at 17:28

## 2021-01-24 RX ADMIN — Medication 1 MILLIGRAM(S): at 12:53

## 2021-01-24 NOTE — PROGRESS NOTE ADULT - PROBLEM SELECTOR PLAN 2
Pt reporting fevers/chills, cough, and SOB. Appears asymptomatic. Afebrile here. On exam, no cough and lungs clear. CXR from 1/21 clear.   - Will monitor for now   - respiratory status is stable  - no tx indicated at this time  - Does not meet criteria for EUA of monoclonal antibodies as he is asymptomatic
Previous hx of fevers and chills, now asymptomatic. Afebrile here. On exam, no cough and lungs clear. CXR from 1/21 clear.   - Will monitor for now   - respiratory status is stable  - no tx indicated at this time

## 2021-01-24 NOTE — PROGRESS NOTE ADULT - PROBLEM SELECTOR PLAN 1
Pt with EtOH abuse. Drinks 3-4 beers and 1.5 pints of hard liquor daily for many years. He has attempted detox before but drinks again because of the death of his parents. Reporting he wants rehab. Will discuss with SW but likely COVID+ precludes rehab in which case will detox here. Has not required benzos thus far.  - Ativan symptom triggered CIWA protocol  - alcohol <10, high risk of withdrawal  - Folate, MV, thiamine
Pt with EtOH abuse. Drinks 3-4 beers and 1.5 pints of hard liquor daily for many years. He has attempted detox before but drinks again because of the death of his parents. Now interested in trying rehab again.  Will discuss with SW but likely COVID+ precludes rehab in which case will detox here. Will order repeat COVID swab.  - Ativan symptom triggered CIWA protocol. No signs of withdrawal  - alcohol <10,   - Folate, MV, thiamine

## 2021-01-24 NOTE — PROGRESS NOTE ADULT - ASSESSMENT
56 y/o M with PMH HTN and EtOH abuse presents for a fall admitted for EtOH intoxication. 
56 y/o M with PMH HTN and EtOH abuse presents for a fall admitted for EtOH intoxication.

## 2021-01-24 NOTE — PROVIDER CONTACT NOTE (OTHER) - SITUATION
Patient alert and orientated, refused blood draw this shift, PA made aware Patient alert and orientated, refused blood draw this shift, risks and benefits discussed with patient, PA made aware

## 2021-01-24 NOTE — PROGRESS NOTE ADULT - SUBJECTIVE AND OBJECTIVE BOX
Nakia Lu MD   [facility]  Cell: [phone]    Patient is a 55y old  Male who presents with a chief complaint of COVID 19 and concern for withdrawal (23 Jan 2021 14:23)      INTERVAL HPI/OVERNIGHT EVENTS: No issues overnight. Pt has no complaints. denies any cp, sob, n/v/d. eating well. CIWA scores are all 1-3    MEDICATIONS  (STANDING):  dextrose 40% Gel 15 Gram(s) Oral once  dextrose 5%. 1000 milliLiter(s) (50 mL/Hr) IV Continuous <Continuous>  dextrose 5%. 1000 milliLiter(s) (100 mL/Hr) IV Continuous <Continuous>  dextrose 50% Injectable 25 Gram(s) IV Push once  dextrose 50% Injectable 12.5 Gram(s) IV Push once  dextrose 50% Injectable 25 Gram(s) IV Push once  enoxaparin Injectable 40 milliGRAM(s) SubCutaneous every 24 hours  folic acid 1 milliGRAM(s) Oral daily  glucagon  Injectable 1 milliGRAM(s) IntraMuscular once  insulin lispro (ADMELOG) corrective regimen sliding scale   SubCutaneous three times a day before meals  multivitamin 1 Tablet(s) Oral daily  potassium phosphate / sodium phosphate Powder (PHOS-NaK) 1 Packet(s) Oral daily  thiamine 100 milliGRAM(s) Oral every 24 hours    MEDICATIONS  (PRN):      Allergies    No Known Allergies    Intolerances        REVIEW OF SYSTEMS:  Please see interval HPI:    Vital Signs Last 24 Hrs  T(C): 36.5 (24 Jan 2021 18:05), Max: 36.9 (24 Jan 2021 05:00)  T(F): 97.7 (24 Jan 2021 18:05), Max: 98.5 (24 Jan 2021 05:00)  HR: 72 (24 Jan 2021 18:05) (68 - 75)  BP: 139/62 (24 Jan 2021 18:05) (121/66 - 141/70)  BP(mean): --  RR: 16 (24 Jan 2021 18:05) (16 - 18)  SpO2: 99% (24 Jan 2021 18:05) (98% - 99%)  I&O's Detail        PHYSICAL EXAM:  Vital Signs Last 24 Hrs  T(C): 36.5 (24 Jan 2021 18:05), Max: 36.9 (24 Jan 2021 05:00)  T(F): 97.7 (24 Jan 2021 18:05), Max: 98.5 (24 Jan 2021 05:00)  HR: 72 (24 Jan 2021 18:05) (68 - 75)  BP: 139/62 (24 Jan 2021 18:05) (121/66 - 141/70)  BP(mean): --  RR: 16 (24 Jan 2021 18:05) (16 - 18)  SpO2: 99% (24 Jan 2021 18:05) (98% - 99%)  CONSTITUTIONAL: NAD, well appearing  ENMT: Moist oral mucosa,  normal dentition  RESPIRATORY: Normal respiratory effort; lungs are clear to auscultation bilaterally  CARDIOVASCULAR: Regular rate and rhythm, normal S1 and S2, no murmur/rub/gallop; No lower extremity edema;   ABDOMEN: Nontender to palpation, normoactive bowel sounds, no rebound/guarding; No hepatosplenomegaly  PSYCH: A+O to person, place, and time; affect appropriate  EXT: no edema b/l  NEUROLOGY: Alert; no gross sensory deficits   SKIN: No rashes; no palpable lesions      LABS:      Ca    9.1        23 Jan 2021 15:17        CAPILLARY BLOOD GLUCOSE      POCT Blood Glucose.: 244 mg/dL (24 Jan 2021 17:08)  POCT Blood Glucose.: 126 mg/dL (24 Jan 2021 11:45)  POCT Blood Glucose.: 186 mg/dL (24 Jan 2021 08:18)  POCT Blood Glucose.: 222 mg/dL (23 Jan 2021 20:31)    BLOOD CULTURE    RADIOLOGY & ADDITIONAL TESTS:    Imaging Personally Reviewed:  [ ] YES     Consultant(s) Notes Reviewed:      Care Discussed with Consultants/Other Providers:

## 2021-01-24 NOTE — PROGRESS NOTE ADULT - PROBLEM SELECTOR PLAN 3
Pt reports he was walking on the street, had LOC, no prodromal symptoms. no cardiac hx. unclear length of LOC  -EKG unremarkable per report  -will order echo as well  -unlikely to be cardiac, most likely related to withdrawal
Hx of HTN. He does not know what medication he's on.   - Monitor BP for now, no need for meds

## 2021-01-24 NOTE — PROGRESS NOTE ADULT - PROBLEM SELECTOR PLAN 4
Hx of DM. Unknown A1C.   - A1c is 9.1  - ISS for now, consider basal/bolus
Hx of HTN. He does not know what medication he's on.   - Monitor BP for now, no need for meds

## 2021-01-25 ENCOUNTER — EMERGENCY (EMERGENCY)
Facility: HOSPITAL | Age: 56
LOS: 1 days | Discharge: HOME | End: 2021-01-25
Attending: EMERGENCY MEDICINE | Admitting: EMERGENCY MEDICINE
Payer: COMMERCIAL

## 2021-01-25 VITALS
OXYGEN SATURATION: 98 % | RESPIRATION RATE: 18 BRPM | SYSTOLIC BLOOD PRESSURE: 133 MMHG | HEART RATE: 75 BPM | TEMPERATURE: 98 F | DIASTOLIC BLOOD PRESSURE: 69 MMHG

## 2021-01-25 VITALS
RESPIRATION RATE: 16 BRPM | HEART RATE: 106 BPM | OXYGEN SATURATION: 97 % | SYSTOLIC BLOOD PRESSURE: 122 MMHG | TEMPERATURE: 101 F | HEIGHT: 67 IN | DIASTOLIC BLOOD PRESSURE: 81 MMHG

## 2021-01-25 LAB
ALBUMIN SERPL ELPH-MCNC: 3.8 G/DL — SIGNIFICANT CHANGE UP (ref 3.3–5)
ALP SERPL-CCNC: 53 U/L — SIGNIFICANT CHANGE UP (ref 40–120)
ALT FLD-CCNC: 11 U/L — SIGNIFICANT CHANGE UP (ref 4–41)
ANION GAP SERPL CALC-SCNC: 12 MMOL/L — SIGNIFICANT CHANGE UP (ref 7–14)
AST SERPL-CCNC: 16 U/L — SIGNIFICANT CHANGE UP (ref 4–40)
BILIRUB SERPL-MCNC: 0.2 MG/DL — SIGNIFICANT CHANGE UP (ref 0.2–1.2)
BUN SERPL-MCNC: 15 MG/DL — SIGNIFICANT CHANGE UP (ref 7–23)
CALCIUM SERPL-MCNC: 9.4 MG/DL — SIGNIFICANT CHANGE UP (ref 8.4–10.5)
CHLORIDE SERPL-SCNC: 99 MMOL/L — SIGNIFICANT CHANGE UP (ref 98–107)
CO2 SERPL-SCNC: 26 MMOL/L — SIGNIFICANT CHANGE UP (ref 22–31)
CREAT SERPL-MCNC: 0.9 MG/DL — SIGNIFICANT CHANGE UP (ref 0.5–1.3)
GLUCOSE BLDC GLUCOMTR-MCNC: 185 MG/DL — HIGH (ref 70–99)
GLUCOSE BLDC GLUCOMTR-MCNC: 88 MG/DL — SIGNIFICANT CHANGE UP (ref 70–99)
GLUCOSE SERPL-MCNC: 156 MG/DL — HIGH (ref 70–99)
HCT VFR BLD CALC: 38.5 % — LOW (ref 39–50)
HGB BLD-MCNC: 12 G/DL — LOW (ref 13–17)
MAGNESIUM SERPL-MCNC: 1.3 MG/DL — LOW (ref 1.6–2.6)
MCHC RBC-ENTMCNC: 28.7 PG — SIGNIFICANT CHANGE UP (ref 27–34)
MCHC RBC-ENTMCNC: 31.2 GM/DL — LOW (ref 32–36)
MCV RBC AUTO: 92.1 FL — SIGNIFICANT CHANGE UP (ref 80–100)
NRBC # BLD: 0 /100 WBCS — SIGNIFICANT CHANGE UP
NRBC # FLD: 0 K/UL — SIGNIFICANT CHANGE UP
PHOSPHATE SERPL-MCNC: 3 MG/DL — SIGNIFICANT CHANGE UP (ref 2.5–4.5)
PLATELET # BLD AUTO: 318 K/UL — SIGNIFICANT CHANGE UP (ref 150–400)
POTASSIUM SERPL-MCNC: 3.8 MMOL/L — SIGNIFICANT CHANGE UP (ref 3.5–5.3)
POTASSIUM SERPL-SCNC: 3.8 MMOL/L — SIGNIFICANT CHANGE UP (ref 3.5–5.3)
PROT SERPL-MCNC: 7.2 G/DL — SIGNIFICANT CHANGE UP (ref 6–8.3)
RBC # BLD: 4.18 M/UL — LOW (ref 4.2–5.8)
RBC # FLD: 14.4 % — SIGNIFICANT CHANGE UP (ref 10.3–14.5)
SODIUM SERPL-SCNC: 137 MMOL/L — SIGNIFICANT CHANGE UP (ref 135–145)
WBC # BLD: 6.49 K/UL — SIGNIFICANT CHANGE UP (ref 3.8–10.5)
WBC # FLD AUTO: 6.49 K/UL — SIGNIFICANT CHANGE UP (ref 3.8–10.5)

## 2021-01-25 PROCEDURE — 99239 HOSP IP/OBS DSCHRG MGMT >30: CPT

## 2021-01-25 PROCEDURE — 99283 EMERGENCY DEPT VISIT LOW MDM: CPT | Mod: 25

## 2021-01-25 PROCEDURE — 93010 ELECTROCARDIOGRAM REPORT: CPT

## 2021-01-25 PROCEDURE — 93306 TTE W/DOPPLER COMPLETE: CPT | Mod: 26

## 2021-01-25 RX ORDER — FOLIC ACID 0.8 MG
1 TABLET ORAL
Qty: 0 | Refills: 0 | DISCHARGE
Start: 2021-01-25

## 2021-01-25 RX ORDER — MAGNESIUM SULFATE 500 MG/ML
2 VIAL (ML) INJECTION ONCE
Refills: 0 | Status: COMPLETED | OUTPATIENT
Start: 2021-01-25 | End: 2021-01-25

## 2021-01-25 RX ORDER — METFORMIN HYDROCHLORIDE 850 MG/1
1 TABLET ORAL
Qty: 60 | Refills: 0
Start: 2021-01-25 | End: 2021-02-23

## 2021-01-25 RX ORDER — THIAMINE MONONITRATE (VIT B1) 100 MG
1 TABLET ORAL
Qty: 0 | Refills: 0 | DISCHARGE
Start: 2021-01-25

## 2021-01-25 RX ADMIN — Medication 1 TABLET(S): at 13:04

## 2021-01-25 RX ADMIN — Medication 1 PACKET(S): at 13:04

## 2021-01-25 RX ADMIN — Medication 4: at 09:18

## 2021-01-25 RX ADMIN — Medication 1 MILLIGRAM(S): at 13:04

## 2021-01-25 RX ADMIN — Medication 50 GRAM(S): at 13:03

## 2021-01-25 NOTE — DISCHARGE NOTE NURSING/CASE MANAGEMENT/SOCIAL WORK - PATIENT PORTAL LINK FT
You can access the FollowMyHealth Patient Portal offered by [facility] by registering at the following website: [email or web address]. By joining [facility] [employer] [facility]’s FollowMyHealth portal, you will also be able to view your health information using other applications (apps) compatible with our system.

## 2021-01-25 NOTE — DISCHARGE NOTE NURSING/CASE MANAGEMENT/SOCIAL WORK - NSDCPEEMAIL_GEN_ALL_CORE
[facility] for Tobacco Control email [email or web address]

## 2021-01-25 NOTE — DISCHARGE NOTE PROVIDER - HOSPITAL COURSE
54 y/o M with PMH HTN and EtOH abuse presents for a fall admitted for EtOH intoxication.  Patient was monitored on symptom triggered CIWA and consistently scored low.  Thiamine and folate given for supplementation.  Tested Positive for COVID and remained on room air. Syncope eval was considered but echocardiogram can be completed as outpatient. NOted to have an A1C of 9.1, started on Metformin 500 BID on discharge. Case discussed with Dr. Cruz on 1/25/21 an case was cleared for discharge. Reviewed discharge medications with patient; All new medications requiring new prescription sent to pharmacy of patients choice. Reviewed need for prescription for previous home medicaitons and new prescriptions sent if requested. Patient in agreement and understands.   54 y/o M with PMH HTN and EtOH abuse presents for a fall admitted for EtOH intoxication.  Patient was monitored on symptom triggered CIWA and consistently scored low.  Thiamine and folate given for supplementation.  Tested Positive for COVID and remained on room air. Syncope eval was considered but echocardiogram can be completed as outpatient. NOted to have an A1C of 9.1, started on Metformin 500 BID on discharge. Case discussed with Dr. Cruz on 1/25/21 an case was cleared for discharge. Reviewed discharge medications with patient; All new medications requiring new prescription sent to pharmacy of patients choice. Reviewed need for prescription for previous home medicaitons and new prescriptions sent if requested. Patient in agreement and understands.    patient sen and examined bedside   patient counseled on alcohol. advised to quit. recommended   AA, acamprosate and outpatient addiction psychiatry for continued care.  he was ambulatory wihtout tremors , no tachycardia , alert oriented on examination x3  discharged home,  total time spent greater than 30 mins

## 2021-01-25 NOTE — DISCHARGE NOTE PROVIDER - NSDCMRMEDTOKEN_GEN_ALL_CORE_FT
folic acid 1 mg oral tablet: 1 tab(s) orally once a day  metFORMIN 500 mg oral tablet: 1 tab(s) orally 2 times a day   Multiple Vitamins oral tablet: 1 tab(s) orally once a day  thiamine 100 mg oral tablet: 1 tab(s) orally every 24 hours

## 2021-01-25 NOTE — ED ADULT TRIAGE NOTE - CHIEF COMPLAINT QUOTE
Pt w/ hx seizures, HTN DM alcohol dependence last drink 2 hrs ago, Per EMS [employer] found PT in street apparently intoxicated and called EMS.  Pt denies any physical complaints is alert and oriented speaks clearly and cooperates in triage.  FS in field 140

## 2021-01-25 NOTE — DISCHARGE NOTE PROVIDER - NSDCCPCAREPLAN_GEN_ALL_CORE_FT
PRINCIPAL DISCHARGE DIAGNOSIS  Diagnosis: COVID-19  Assessment and Plan of Treatment: You have been diagnosed with the COVID-19 virus during your hospital stay. You must self quarantine to complete a 14 day time period and until you no longer have fever or symptoms for 3 consecutive days.  Monitor your temperature daily to not any changes and increases.  Please remain in self quaratine while you continue to recover at home.  For questions regarding COVID-19 the  [place] [facility] can be reached at [phone] for further information about COVID-19.   1. You should restrict activities outside your home, except for getting medical care.  2. Do not go to work, school, or public areas.  3. Avoid using public transportation, ride-sharing, or taxis.  4. Separate yourself from other people and animals in your home; Use a seperate bathroom if possible.   5. Call ahead before visiting your doctor and notify the staff that you had or may have COVID 19.   6. Wear a facemask when you are around other people (e.g., sharing a room or vehicle) or pets and before you enter a healthcare provider’s office. If you are unable to wear a mask then the people around you should wear one.   8. Clean your hands often.  Soap and water are the best option if hands are visibly dirty. Avoid touching your eyes, nose, and mouth with unwashed hands.  9. Avoid sharing personal household items.  10. Clean all “high-touch” surfaces everyday to help limit spread of the virus  11. Monitor your symptoms and Seek prompt medical attention if your illness is worsening. If you have a medical emergency and need to call 911, notify the dispatch personnel.      SECONDARY DISCHARGE DIAGNOSES  Diagnosis: ETOH abuse  Assessment and Plan of Treatment: Please abstain from drinking further alcochol. Information for help services was provided to you by social work.    Diagnosis: Diabetes  Assessment and Plan of Treatment: - Hypoglycemia Management : Please check your fingersticks every morning or if you are not feeling well.  If your fingerstick is >300 mg/dl x 3 or more readings, please contact your doctor. . If your fingerstick low, <70 mg/dl and/or you have symptoms of very low blood sugar, FIRST drink 1/2 cup of apple juice, (or take 4 glucose tabs/tube of glucose gel) and recheck fingerstick in 15 minutes.  Repeat these steps until blood sugar is above 100 mg/dl, IF NECESSARY.  Then call provider your doctor to discuss low blood sugar.

## 2021-01-25 NOTE — DISCHARGE NOTE NURSING/CASE MANAGEMENT/SOCIAL WORK - NSDCPEWEB_GEN_ALL_CORE
[facility] website --- [email or web address] website --- [email or web address]

## 2021-01-26 ENCOUNTER — EMERGENCY (EMERGENCY)
Facility: HOSPITAL | Age: 56
LOS: 1 days | Discharge: HOME | End: 2021-01-26
Attending: EMERGENCY MEDICINE | Admitting: EMERGENCY MEDICINE
Payer: COMMERCIAL

## 2021-01-26 VITALS
RESPIRATION RATE: 18 BRPM | SYSTOLIC BLOOD PRESSURE: 106 MMHG | TEMPERATURE: 99 F | OXYGEN SATURATION: 100 % | HEART RATE: 92 BPM | HEIGHT: 67 IN | DIASTOLIC BLOOD PRESSURE: 70 MMHG

## 2021-01-26 VITALS
RESPIRATION RATE: 15 BRPM | SYSTOLIC BLOOD PRESSURE: 135 MMHG | DIASTOLIC BLOOD PRESSURE: 81 MMHG | HEART RATE: 82 BPM | OXYGEN SATURATION: 100 % | TEMPERATURE: 98 F

## 2021-01-26 VITALS
DIASTOLIC BLOOD PRESSURE: 64 MMHG | TEMPERATURE: 99 F | OXYGEN SATURATION: 100 % | HEART RATE: 82 BPM | SYSTOLIC BLOOD PRESSURE: 122 MMHG | RESPIRATION RATE: 20 BRPM

## 2021-01-26 LAB
ALBUMIN SERPL ELPH-MCNC: 3.8 G/DL — SIGNIFICANT CHANGE UP (ref 3.3–5)
ALP SERPL-CCNC: 55 U/L — SIGNIFICANT CHANGE UP (ref 40–120)
ALT FLD-CCNC: 18 U/L — SIGNIFICANT CHANGE UP (ref 4–41)
ANION GAP SERPL CALC-SCNC: 10 MMOL/L — SIGNIFICANT CHANGE UP (ref 7–14)
AST SERPL-CCNC: 33 U/L — SIGNIFICANT CHANGE UP (ref 4–40)
BASOPHILS # BLD AUTO: 0.13 K/UL — SIGNIFICANT CHANGE UP (ref 0–0.2)
BASOPHILS NFR BLD AUTO: 1.8 % — SIGNIFICANT CHANGE UP (ref 0–2)
BILIRUB SERPL-MCNC: 0.3 MG/DL — SIGNIFICANT CHANGE UP (ref 0.2–1.2)
BUN SERPL-MCNC: 14 MG/DL — SIGNIFICANT CHANGE UP (ref 7–23)
CALCIUM SERPL-MCNC: 8.9 MG/DL — SIGNIFICANT CHANGE UP (ref 8.4–10.5)
CHLORIDE SERPL-SCNC: 97 MMOL/L — LOW (ref 98–107)
CO2 SERPL-SCNC: 25 MMOL/L — SIGNIFICANT CHANGE UP (ref 22–31)
CREAT SERPL-MCNC: 0.93 MG/DL — SIGNIFICANT CHANGE UP (ref 0.5–1.3)
EOSINOPHIL # BLD AUTO: 0.07 K/UL — SIGNIFICANT CHANGE UP (ref 0–0.5)
EOSINOPHIL NFR BLD AUTO: 0.9 % — SIGNIFICANT CHANGE UP (ref 0–6)
ETHANOL SERPL-MCNC: <10 MG/DL — SIGNIFICANT CHANGE UP
GLUCOSE SERPL-MCNC: 251 MG/DL — HIGH (ref 70–99)
HCT VFR BLD CALC: 40.6 % — SIGNIFICANT CHANGE UP (ref 39–50)
HGB BLD-MCNC: 12.8 G/DL — LOW (ref 13–17)
IANC: 4.66 K/UL — SIGNIFICANT CHANGE UP (ref 1.5–8.5)
LYMPHOCYTES # BLD AUTO: 1.17 K/UL — SIGNIFICANT CHANGE UP (ref 1–3.3)
LYMPHOCYTES # BLD AUTO: 15.9 % — SIGNIFICANT CHANGE UP (ref 13–44)
MAGNESIUM SERPL-MCNC: 1.8 MG/DL — SIGNIFICANT CHANGE UP (ref 1.6–2.6)
MCHC RBC-ENTMCNC: 28.5 PG — SIGNIFICANT CHANGE UP (ref 27–34)
MCHC RBC-ENTMCNC: 31.5 GM/DL — LOW (ref 32–36)
MCV RBC AUTO: 90.4 FL — SIGNIFICANT CHANGE UP (ref 80–100)
MONOCYTES # BLD AUTO: 1.31 K/UL — HIGH (ref 0–0.9)
MONOCYTES NFR BLD AUTO: 17.7 % — HIGH (ref 2–14)
NEUTROPHILS # BLD AUTO: 4.31 K/UL — SIGNIFICANT CHANGE UP (ref 1.8–7.4)
NEUTROPHILS NFR BLD AUTO: 58.4 % — SIGNIFICANT CHANGE UP (ref 43–77)
PHOSPHATE SERPL-MCNC: 3.7 MG/DL — SIGNIFICANT CHANGE UP (ref 2.5–4.5)
PLATELET # BLD AUTO: 314 K/UL — SIGNIFICANT CHANGE UP (ref 150–400)
POTASSIUM SERPL-MCNC: 4.5 MMOL/L — SIGNIFICANT CHANGE UP (ref 3.5–5.3)
POTASSIUM SERPL-SCNC: 4.5 MMOL/L — SIGNIFICANT CHANGE UP (ref 3.5–5.3)
PROT SERPL-MCNC: 7.5 G/DL — SIGNIFICANT CHANGE UP (ref 6–8.3)
RBC # BLD: 4.49 M/UL — SIGNIFICANT CHANGE UP (ref 4.2–5.8)
RBC # FLD: 14.6 % — HIGH (ref 10.3–14.5)
SODIUM SERPL-SCNC: 132 MMOL/L — LOW (ref 135–145)
TROPONIN T, HIGH SENSITIVITY RESULT: 23 NG/L — SIGNIFICANT CHANGE UP
TROPONIN T, HIGH SENSITIVITY RESULT: 26 NG/L — SIGNIFICANT CHANGE UP
WBC # BLD: 7.38 K/UL — SIGNIFICANT CHANGE UP (ref 3.8–10.5)
WBC # FLD AUTO: 7.38 K/UL — SIGNIFICANT CHANGE UP (ref 3.8–10.5)

## 2021-01-26 PROCEDURE — 99283 EMERGENCY DEPT VISIT LOW MDM: CPT

## 2021-01-26 PROCEDURE — 93010 ELECTROCARDIOGRAM REPORT: CPT

## 2021-01-26 RX ORDER — ACETAMINOPHEN 500 MG
975 TABLET ORAL ONCE
Refills: 0 | Status: COMPLETED | OUTPATIENT
Start: 2021-01-26 | End: 2021-01-26

## 2021-01-26 RX ORDER — FOLIC ACID 0.8 MG
1 TABLET ORAL ONCE
Refills: 0 | Status: COMPLETED | OUTPATIENT
Start: 2021-01-26 | End: 2021-01-26

## 2021-01-26 RX ORDER — THIAMINE MONONITRATE (VIT B1) 100 MG
100 TABLET ORAL ONCE
Refills: 0 | Status: COMPLETED | OUTPATIENT
Start: 2021-01-26 | End: 2021-01-26

## 2021-01-26 RX ADMIN — Medication 50 MILLIGRAM(S): at 07:16

## 2021-01-26 RX ADMIN — Medication 1 MILLIGRAM(S): at 03:25

## 2021-01-26 RX ADMIN — Medication 50 MILLIGRAM(S): at 16:21

## 2021-01-26 RX ADMIN — Medication 50 MILLIGRAM(S): at 03:25

## 2021-01-26 RX ADMIN — Medication 1 TABLET(S): at 03:25

## 2021-01-26 RX ADMIN — Medication 100 MILLIGRAM(S): at 03:25

## 2021-01-26 RX ADMIN — Medication 975 MILLIGRAM(S): at 03:25

## 2021-01-26 NOTE — PROVIDER CONTACT NOTE (OTHER) - ASSESSMENT
pt still awaiting [facility].  marty called [facility] and spoke with supervisor.  marty was advised that pt still awaiting transport [facility].  marty called [facility] and spoke with supervisor.  marty was advised that [facility] is unable to transport the patient at this time.  marty requested a taxi.  Otto Claves will be transporting the pt to [place].  Conf#[id number].

## 2021-01-26 NOTE — ED ADULT TRIAGE NOTE - CHIEF COMPLAINT QUOTE
Patient has c/o "passing out". Pt was here yesterday, tested Positive for CoronaVirus on 1/21 and was released yesterday. Not giving me clear answers as to what the MD told him when he left yesterday.

## 2021-01-26 NOTE — ED PROVIDER NOTE - PATIENT PORTAL LINK FT
You can access the FollowMyHealth Patient Portal offered by [facility] by registering at the following website: [email or web address]. By joining [employer]’s FollowMyHealth portal, you will also be able to view your health information using other applications (apps) compatible with our system.

## 2021-01-26 NOTE — ED ADULT NURSE NOTE - NSIMPLEMENTINTERV_GEN_ALL_ED
Implemented All Fall Risk Interventions:  [place] to call system. Call bell, personal items and telephone within reach. Instruct patient to call for assistance. Room bathroom lighting operational. Non-slip footwear when patient is off stretcher. Physically safe environment: no spills, clutter or unnecessary equipment. Stretcher in lowest position, wheels locked, appropriate side rails in place. Provide visual cue, wrist band, yellow gown, etc. Monitor gait and stability. Monitor for mental status changes and reorient to person, place, and time. Review medications for side effects contributing to fall risk. Reinforce activity limits and safety measures with patient and family.

## 2021-01-26 NOTE — ED PROVIDER NOTE - NSFOLLOWUPINSTRUCTIONS_ED_ALL_ED_FT
-You were seen in the Emergency Department (ED) for syncope. Lab and imaging results, if performed, were discussed with you along with your discharge diagnosis.    MEDICATIONS:  -Continue all other prescribed medicine, IF ANY, as per your primary care doctor's (PMD) recommendations.    PAIN CONTROL:  -Please take over the counter Tylenol (also known as acetaminophen) 650mg every 6 hours or Ibuprofen (also known as motrin, advil) 600mg every 8 hour for your pain, IF ANY, unless you are not supposed to for any reason.  -Rest, stay hydrated with plenty of fluids (drink at least 2 Liters or 64 Ounces of water each day UNLESS you are supposed to restrict fluids or ANY reason.    FOLLOW-UP:  -Please follow up with your PMD if symptoms return or for any concerning matter pertaining to your syncope.      RETURN PRECAUTIONS:  -Please return to the Emergency Department if you experience ANY new or concerning symptoms, such as, but not limited to: worsening pain, large amount of bleeding, passing out, fever >100.F, shaking chills, inability to see or new double vision, chest pain, difficulty breathing, diffuse abdominal pain, unable to eat or drink, continuous vomiting or diarrhea, unable to move or feel part of your body

## 2021-01-26 NOTE — ED PROVIDER NOTE - PHYSICAL EXAMINATION
Man NORRIS MD PGY3:   PHYSICAL EXAM:    GENERAL: Unkempt  HEENT:  Atraumatic, Normocephalic  CHEST/LUNG: Chest rise equal bilaterally. CTAB. No tachypnea.   HEART: Regular rate and rhythm. No murmurs or rubs.   ABDOMEN: Soft, Nontender, Nondistended  EXTREMITIES:  2+ Peripheral Pulses.  PSYCH: A&Ox3  SKIN: No obvious rashes or lesions

## 2021-01-26 NOTE — ED PROVIDER NOTE - ATTENDING CONTRIBUTION TO CARE
Attending note:   After face to face evaluation of this patient, I concur with above noted hx, pe, and care plan for this patient.  Uribe: 55 yom with HTN, etoh abuse seen on ED and admitted multiple times. On chart review, pt has admitted for syncope and covid twice and seen in ED last night as well. Pt states he can't remember syncope today but woke up on the ground, no trauma?. Pt denies chest pain, sob, headache or weakness. Pt has very poor understanding. Pt is well appearing, no distress, clear lungs, normal cardiac, abd soft and non tender, no tremors, no edema, skin intact with no trauma, pulses strong in all 4 ext. Gait not assessed yet but noted to have walked in. Will check EKG, labs, tele monitor. Echo from 2 days ago reviewed and normal.

## 2021-01-26 NOTE — ED PROVIDER NOTE - PATIENT PORTAL LINK FT
You can access the FollowMyHealth Patient Portal offered by [facility] by registering at the following website: [email or web address]. By joining [employer]’s FollowMyHealth portal, you will also be able to view your health information using other applications (apps) compatible with our system.

## 2021-01-26 NOTE — ED PROVIDER NOTE - CLINICAL SUMMARY MEDICAL DECISION MAKING FREE TEXT BOX
Monitor for signs of withdrawal. COVID positive with low grade fever, no SOB and good pulse Ox on RA. Monitor for signs of withdrawal. COVID positive with low grade fever, no SOB and good pulse Ox on RA. Pt referred to [facility]. Pt known to examiner from one previous ED visit this month.

## 2021-01-26 NOTE — ED PROVIDER NOTE - FAMILY HISTORY
Discharge Summary


Hospital Course


Was the Problem List Reviewed?:  Yes


Problems/Dx:  


(1) Person under investigation for COVID-19


Status:  Acute


(2) Tachycardia


Status:  Acute


(3) Dyspnea


Status:  Acute


Qualifiers:  


   Qualified Codes:  R06.02 - Shortness of breath


Hospital Course


Date of Admission: Oct 6, 2020 at 04:42 


Admission Diagnosis :  





Family Physician/Provider: Kailash Jimenez DO  





Date of Discharge: 10/9/20 


Discharge Diagnosis: Hypoxia, CHF, non-ischemic cardiomyopathy, bed bugs, PNA








Hospital Course:


Judith Wisdom is a 29 y/o female with hx of gout and recent bronchitis presents 

for SOB and associated chest pain. Patient stated that she had diagnosis of  

pneumonia about 1 month ago and has since felt SOB, reporting she had a coughing

fit to the point she felt chest pain and could not catch her breath which 

prompted her ER visit. Patient was given 1L fluid bolus and had VQ scan ordered 

(unable to get CT angiogram secondary to renal insufficiency). Patient was 

admitted, cxr was consistent with PNA and patient was treated with abx. 

Cardiology was consulted secondary to chest pain which prompted subsequent 

cardiac catheterization: Congestive heart failure, acute left ventricular 

systolic dysfunction,


nonischemic cardiomyopathy. Patient reports no current complaints, states she 

can breathe freely and is able to ambulate without assistance. Patient will be 

d/c home with halter monitor to monitor symptoms having been cleared by 

cardiology.


BRAD HERRERA STUDENT














Labs and Pending Lab Test:


Laboratory Tests


10/9/20 05:50: 


White Blood Count 18.3H, Red Blood Count 3.95, Hemoglobin 11.2L, Hematocrit 36, 

Mean Corpuscular Volume 91, Mean Corpuscular Hemoglobin 28, Mean Corpuscular 

Hemoglobin Concent 31L, Red Cell Distribution Width 14.8H, Platelet Count 294, 

Mean Platelet Volume 9.6, Immature Granulocyte % (Auto) 1, Neutrophils (%) 

(Auto) 86H, Lymphocytes (%) (Auto) 9L, Monocytes (%) (Auto) 4, Eosinophils (%) 

(Auto) 0, Basophils (%) (Auto) 0, Neutrophils # (Auto) 15.7H, Lymphocytes # 

(Auto) 1.7, Monocytes # (Auto) 0.8, Eosinophils # (Auto) 0.0, Basophils # (Auto)

0.0, Immature Granulocyte # (Auto) 0.1, Sodium Level 140, Potassium Level 3.9, 

Chloride Level 111H, Carbon Dioxide Level 18L, Anion Gap 11, Blood Urea Nitrogen

27H, Creatinine 1.29, Estimat Glomerular Filtration Rate 48, BUN/Creatinine 

Ratio 21, Glucose Level 114H, Calcium Level 8.4L, Corrected Calcium 8.9, Total 

Bilirubin 1.0, Aspartate Amino Transf (AST/SGOT) 40H, Alanine Aminotransferase (

ALT/SGPT) 75H, Alkaline Phosphatase 51, Total Protein 6.2L, Albumin 3.4





Home Meds


Active


Reported


Benadryl (Diphenhydramine HCl) 25 Mg Capsule 25-50 Mg PO Q6H PRN


Tylenol Extra Strength (Acetaminophen) 500 Mg Tablet 1,000 Mg PO Q6H PRN


Symbicort 160-4.5 Mcg Inhaler (Budesonide/Formoterol Fumarate) 10.2 Gm 

Hfa.aer.ad 2 Puff INH BID


Allopurinol 300 Mg Tablet 300 Mg PO DAILY


Assessment/Pt Instructions


pcp 1 week


Discharge Planning:  <30 minutes discharge planning





Discharge Instructions


Discharge Diet:  No Restrictions





Discharge Physical Examination


Vital Signs





Vital Signs








  Date Time  Temp Pulse Resp B/P (MAP) Pulse Ox O2 Delivery O2 Flow Rate FiO2


 


10/9/20 10:46     94 Nasal Cannula 1.00 


 


10/9/20 08:00 36.2 128 18 119/93 (102)    


 


10/6/20 08:17        28








General Appearance:  No Apparent Distress, WD/WN


Allergies:  


Coded Allergies:  


     Penicillins (Verified  Allergy, Unknown, 10/6/20)


     azithromycin (Verified  Allergy, Unknown, 10/8/20)


     cefaclor (Verified  Allergy, Unknown, 08)





Discharge Summary


Date of Admission


Oct 6, 2020 at 04:42


Date of Discharge





Discharge Date:  Oct 9, 2020


Admission Diagnosis


Hypoxia


Discharge Diagnosis


PNA w/u mostly c/w pulmonary edema





(1) Person under investigation for COVID-19


Status:  Acute


(2) Tachycardia


Status:  Acute


(3) Dyspnea


Status:  Acute


Qualifiers:  


   Qualified Codes:  R06.02 - Shortness of breath





Clinical Quality Measures


DVT/VTE Risk/Contraindication:


Risk Factor Score Per Nursin


RFS Level Per Nursing on Admit:  2=Moderate











KAYLEEN ENRIQUEZ DO                 Oct 9, 2020 12:18 No pertinent family history in first degree relatives

## 2021-01-26 NOTE — ED ADULT NURSE NOTE - OBJECTIVE STATEMENT
Pt presents to ED ambulatory from home with c/o syncopal episode while walking down the street. Pt is A&OX3, skin warm dry unremarkable, + strong regular radial pulses bi laterally. Pt changed into gown and placed on cardiac monitor. Pt was recently seen in this ED and evaluated for similar complaints. Pt denies head neck or back pain, chest pain, difficulty breathing, fever, cough, chills, shortness of breath, fever, cough, or chills.

## 2021-01-26 NOTE — ED PROVIDER NOTE - CLINICAL SUMMARY MEDICAL DECISION MAKING FREE TEXT BOX
Man NORRIS MD PGY3: 55 M PMH HTN and EtOH abuse here for recurrent syncope without other injury. Patient was worked up for syncope and not found to have any clear factor causing syncope. Will obtain lytes, trop, rpt EKG showing ? new TWI in V4-V6 without abnormal intervals. If no abnormality, will observe patient here. Didn't have any tele events during recent hospitalization. Patient does not appear intoxicated at this time.

## 2021-01-26 NOTE — ED PROVIDER NOTE - NSFOLLOWUPINSTRUCTIONS_ED_ALL_ED_FT
A representative will call you today to set up a phone appointment with our coronavirus doctor to monitor your symptoms at home.    Seek medical attention if you have trouble breathing, feel weak or have any concerns.

## 2021-01-26 NOTE — ED PROVIDER NOTE - CARE PLAN
Principal Discharge DX:	Syncope and collapse   Principal Discharge DX:	Syncope and collapse  Secondary Diagnosis:	COVID-19

## 2021-01-26 NOTE — ED ADULT NURSE REASSESSMENT NOTE - NS ED NURSE REASSESS COMMENT FT1
Pt resting comfortably. Medicated with Librium as per MD order. Pt provided a meal to eat. Awaiting transport services. Pt resting comfortably. Medicated with Librium as per MD order. Pt provided a meal to eat. Awaiting transport services.  @[phone]-  Ever escorted patient out to waiting room where cab is there to drive him back.

## 2021-01-26 NOTE — PROVIDER CONTACT NOTE (OTHER) - ASSESSMENT
pt is stable to leave.  Pt is reqeusting to go to his residence at [address] [place] [address].  [facility] called and transportation set for 1 to 2pm [facility].  auth# [id number]. pt is stable to leave.  Pt is requesting to go to his residence at [address] [place] [address].  [facility] called and transportation set for 2pm via [facility].  auth# [id number].  [facility] to [facility] the pt.

## 2021-01-26 NOTE — ED PROVIDER NOTE - PHYSICAL EXAMINATION
CONSTITUTIONAL: Well appearing, well nourished, awake, alert, oriented to person, place, time/situation and in no apparent distress  ENMT: Airway patent  EYES: Clear bilaterally  CARDIAC: Normal rate, regular rhythm.  RESPIRATORY: Breath sounds clear and equal bilaterally.  ABDOMEN: Abdomen soft, non-tender, no guarding  MUSCULOSKELETAL: Spine appears normal, no deformities, equal active FROM bilaterally  NEUROLOGIC: CN II-XII grossly intact, moves all extremities without lateralization  SKIN: Exposed skin normal color for race, warm, dry and intact

## 2021-01-26 NOTE — PROVIDER CONTACT NOTE (OTHER) - ASSESSMENT
Finally after multiple calls to [facility] I got him out with [employer] through [facility] Auth# [id number]. Pt was picked up at 7.30.

## 2021-01-26 NOTE — ED PROVIDER NOTE - OBJECTIVE STATEMENT
55M PMH of alcohol abuse, HTN, DM2 BIBEMS after found on street intoxicated. Patient was discharged today from [facility] after an inpatient stay for alcohol withdrawal. Pt c/o generalized, gradual onset headache. No cough, SOB, CP. Pt admits to drinking hard liquor after discharge. Pt tested positive for COVID on 1/21/21. 55M PMH of alcohol abuse, HTN, DM2 BIBEMS after found on street intoxicated. Patient was discharged today from [facility] after an inpatient stay for alcohol withdrawal. Pt c/o generalized, gradual onset headache. No cough, SOB, CP. Pt admits to drinking hard liquor after discharge. Pt tested positive for COVID on 1/21/21.  Home phone: [phone]

## 2021-01-26 NOTE — ED PROVIDER NOTE - PROGRESS NOTE DETAILS
Pt awake. NAD. Ambulated well to rest room no tremors. Comfortable respirations on RA. Patient candidate for covid antibody infusion, however, no nursing availability this AM due to staffing shortage. Will refer pt to [facility] infusion center. NIR. Pt awake. NAD. Ambulated well to rest room no tremors. Comfortable respirations on RA. Patient candidate for covid antibody infusion, however, no nursing availability this AM due to staffing shortage. Will refer pt to [facility] infusion center. Second dose of Librium given to prevent any withdrawal signs during transport home. [facility] provided. NIR.

## 2021-01-26 NOTE — ED PROVIDER NOTE - OBJECTIVE STATEMENT
Man NORRIS MD PGY3: 55 M PMH HTN and EtOH abuse here for CC of syncope. Patient states that he was walking down the street and had an episode of syncope. States that it was unprovoked without prodrome and he awoke to find someone waking him up. No complaints of headache, neck pain, nausea or vomiting. No fevers, chills. States that he feels alright now. No chest pain, SOB.

## 2021-01-26 NOTE — ED ADULT NURSE NOTE - OBJECTIVE STATEMENT
Pt received in intake room 1. Patient has hx of etoh abuse, states last drink was Sunday, drank "beer, liquor, wine". Pt dc'd today from [facility] after inpatient stay. Pt given medication as per MD order. Pt in no acute distress, respirations even and unlabored. Will continue to monitor.

## 2021-04-08 NOTE — ED ADULT TRIAGE NOTE - HEART RATE (BEATS/MIN)
[de-identified] : [facility] by Dr. Keli Flores.  
88

## 2021-04-28 NOTE — ED PROVIDER NOTE - PRINCIPAL DIAGNOSIS
Syncope [Well Developed] : well developed [Well Nourished] : well nourished [No Acute Distress] : no acute distress [Normal Conjunctiva] : normal conjunctiva [Normal Venous Pressure] : normal venous pressure [No Carotid Bruit] : no carotid bruit [Normal S1, S2] : normal S1, S2 [No Murmur] : no murmur [No Rub] : no rub [No Gallop] : no gallop [Clear Lung Fields] : clear lung fields [Good Air Entry] : good air entry [No Respiratory Distress] : no respiratory distress  [Soft] : abdomen soft [Non Tender] : non-tender [No Masses/organomegaly] : no masses/organomegaly [Normal Bowel Sounds] : normal bowel sounds [Normal Gait] : normal gait [No Edema] : no edema [No Cyanosis] : no cyanosis [No Clubbing] : no clubbing [No Varicosities] : no varicosities [No Rash] : no rash [No Skin Lesions] : no skin lesions [Moves all extremities] : moves all extremities [No Focal Deficits] : no focal deficits [Normal Speech] : normal speech [Alert and Oriented] : alert and oriented [Normal memory] : normal memory

## 2023-01-19 NOTE — PROGRESS NOTE ADULT - PROBLEM SELECTOR PLAN 5
Hx of DM. Unknown A1C.   - A1c is 9.1  - will start metformin 500mg bid tomorrow  -c/w ISS
show
F: S/p 1L bolus  E: Replete prn  N: DASH/TLC consistent carb

## 2023-05-24 NOTE — ED ADULT NURSE NOTE - PLAN OF CARE DISCUSSED WITH:
[FreeTextEntry1] : Echo: 2/7/2022: Mild to moderate mitral regurgitation. Bioprosthetic aortic valve replacement. Peak transaortic valve gradient of 40 mmHg and mean gradient of 21 mmHg, which is probably normal in the setting of a prosthetic valve. Moderately dilated left atrium. Mild right atrial enlargement. Mild left ventricular enlargement. Moderate diastolic dysfunction. Endocardium not well visualized; grossly preserved left ventricular ejection fraction with hypokinesis of the basal inferior wall. Right ventricular enlargement with normal right ventricular systolic function. Mild to moderate tricuspid regurgitation. Mild pulmonary HTN with PASP= 41 mmHg.
Patient

## 2023-06-19 NOTE — ED ADULT TRIAGE NOTE - NS ED NURSE AMBULANCES
KARINA, GENOVEVA [place] Nurse Msg Pool  Rinvoq 15<G - Pending Additional Information       Rx Insurance: [employer]         Called patient to follow up and see if she has reached out to her insurance for the approval of rinvoq.  Patient had said she received a letter saying this PA was denied. I let her know this was denied due to her not following up with the requested information needed. She will call to re open PA.       6/19/23   Francisca COLLINS     [facility]

## 2023-08-21 NOTE — ED ADULT NURSE NOTE - ED STAT RN HANDOFF WHERE
Red Z Plasty Text: The lesion was extirpated to the level of the fat with a #15 scalpel blade.  Given the location of the defect, shape of the defect and the proximity to free margins a Z-plasty was deemed most appropriate for repair.  Using a sterile surgical marker, the appropriate transposition arms of the Z-plasty were drawn incorporating the defect and placing the expected incisions within the relaxed skin tension lines where possible.    The area thus outlined was incised deep to adipose tissue with a #15 scalpel blade.  The skin margins were undermined to an appropriate distance in all directions utilizing iris scissors.  The opposing transposition arms were then transposed into place in opposite direction and anchored with interrupted buried subcutaneous sutures.

## 2023-10-02 NOTE — ED ADULT NURSE NOTE - NSFALLRSKOUTCOME_ED_ALL_ED
----- Message from Jeanine Painting RN sent at 10/2/2023  9:58 AM CDT -----  Regarding: Pre-Op Testing  Good morning,    We are trying to schedule pre-op testing and review this patient's chart prior to his surgery on 10/4. We have been leaving messages for the patient since 09/27 with no response. Are you able to reach out to this patient? The patient can reach me at [phone]. Thank you.    Pre-Admit Testing     Fall Risk

## 2024-03-07 PROBLEM — Z78.9 OTHER SPECIFIED HEALTH STATUS: Chronic | Status: ACTIVE | Noted: 2019-11-27

## 2025-07-28 NOTE — PATIENT PROFILE ADULT - FALL HARM RISK
Detail Level: Detailed
Include Z78.9 (Other Specified Conditions Influencing Health Status) As An Associated Diagnosis?: No
Duration Of Freeze Thaw-Cycle (Seconds): 5-10
Show Spray Paint Technique Variable?: Yes
other
Post-Care Instructions: I reviewed with the patient in detail post-care instructions. Patient is to wear sunprotection, and avoid picking at any of the treated lesions. Pt may apply Vaseline to crusted or scabbing areas.
Spray Paint Text: The liquid nitrogen was applied to the skin utilizing a spray paint frosting technique.
Consent: The patient's consent was obtained including but not limited to risks of crusting, scabbing, blistering, scarring, darker or lighter pigmentary change, recurrence, incomplete removal and infection.
Number Of Freeze-Thaw Cycles: 1 freeze-thaw cycle
Medical Necessity Information: It is in your best interest to select a reason for this procedure from the list below. All of these items fulfill various CMS LCD requirements except the new and changing color options.
Medical Necessity Clause: This procedure was medically necessary because the lesions that were treated were: